# Patient Record
Sex: MALE | Race: WHITE | NOT HISPANIC OR LATINO | Employment: OTHER | URBAN - METROPOLITAN AREA
[De-identification: names, ages, dates, MRNs, and addresses within clinical notes are randomized per-mention and may not be internally consistent; named-entity substitution may affect disease eponyms.]

---

## 2018-01-10 NOTE — PROGRESS NOTES
Assessment    1  Anxiety (300 00) (F41 9)    Discussion/Summary  Discussion Summary:   No change to lexparo , ok to decrease to 1/2 tablet when desired  call if any issues with lexapro or urination  return in 6months  no labs  Chief Complaint  Chief Complaint Free Text Note Form: pt is here for 3 month anxiety f/u  review colonoscopy      History of Present Illness  Anxiety Disorder (Follow-Up): The patient is being seen for follow-up of generalized anxiety disorder  The patient reports doing poorly  He has no comorbid illnesses  Started on lexapro 10 mg 3 weeks ago, feels better but having tiredness side effects   Interval symptoms:  denies anxiety, denies difficulty concentrating, new onset of restlessness, improved restlessness, denies panic attacks and denies depression  Associated symptoms: no grandiosity, no racing thoughts, no periods of euphoria, no hallucinations and no suicidal ideation  Medications:  the patient is not adherent to his medication regimen and he denies medication side effects  Disease management:  the patient is doing well with his goals  Review of Systems  Complete-Male:   Constitutional: no fever, no recent weight gain, no chills, not feeling tired and no recent weight loss  Eyes: no eyesight problems  ENT: no nosebleeds, no sore throat and no nasal discharge  Cardiovascular: no palpitations and no extremity edema  Respiratory: no shortness of breath  Gastrointestinal: no nausea and no vomiting  Genitourinary: nocturia and 0-1  Musculoskeletal: no arthralgias  Integumentary: no rashes  Neurological: no headache and no dizziness  Psychiatric: as noted in HPI  Hematologic/Lymphatic: no tendency for easy bruising  Active Problems    1  Adrenal adenoma (227 0) (D35 00)   2  Anxiety (300 00) (F41 9)   3  History of colon polyps (V12 72) (Z86 010)   4  Thoracic aortic aneurysm (441 2) (I71 2)    Past Medical History    1   History of arthritis (V13 4) (Z87 39)   2  History of cardiac disorder (V12 50) (Z86 79)   3  History of hearing loss (V12 49) (Z86 69)   4  History of Screen for colon cancer (V76 51) (Z12 11)    Surgical History    1  History of Appendectomy   2  History of Hernia Repair   3  History of Shoulder Surgery Left    Family History    1  Denied: Family history of Colon cancer   2  Denied: Family history of Crohn's disease without complication, unspecified gastrointestinal tract   location   3  Denied: Family history of liver disease    4  Denied: Family history of Colon cancer   5  Denied: Family history of Crohn's disease without complication, unspecified gastrointestinal tract   location   6  Denied: Family history of liver disease   7  Family history of Throat cancer    Social History    · Denied: History of Drug use   · Former smoker (V15 82) (D13 596)   · Occasional alcohol use    Current Meds   1  Daily Multi Oral Tablet; TAKE 1 TABLET DAILY; Therapy: (Recorded:[id number]) to Recorded   2  Escitalopram Oxalate 10 MG Oral Tablet; TAKE 1 TABLET DAILY; Therapy: [id number] to (Xander Mahoney)  Requested for: 05Aps8322; Last Rx:[id number]   Ordered  Medication List Reviewed: The medication list was reviewed and updated today  Allergies    1  No Known Drug Allergies    Vitals  Vital Signs [Data Includes: Current Encounter]    Recorded: 73DRQ2721 03:13PM   Temperature 98 2 F, Oral   Heart Rate 77   Systolic 061, LUE, Sitting   Diastolic 82, LUE, Sitting   BP Cuff Size Large   Height 6 ft    Weight 203 lb    BMI Calculated 27 53   BSA Calculated 2 14   O2 Saturation 97, RA     Physical Exam    Constitutional   General appearance: No acute distress, well appearing and well nourished  Eyes   Conjunctiva and lids: No swelling, erythema, or discharge  Ears, Nose, Mouth, and Throat   External inspection of ears and nose: Normal     Oropharynx: Normal with no erythema, edema, exudate or lesions      Pulmonary   Auscultation of lungs: Clear to auscultation, equal breath sounds bilaterally, no wheezes, no rales, no rhonci  Cardiovascular   Auscultation of heart: Normal rate and rhythm, normal S1 and S2, without murmurs  Examination of extremities for edema and/or varicosities: Normal     Carotid pulses: Normal     Abdomen   Abdomen: Non-tender, no masses  Liver and spleen: No hepatomegaly or splenomegaly  Musculoskeletal   Gait and station: Normal     Neurologic   Cranial nerves: Cranial nerves 2-12 intact  Psychiatric   Orientation to person, place and time: Normal     Mood and affect: Normal     Additional Exam:  heme occult negative, mild BPH, enlarged hemorrhoid at 7 oclock  Health Management  History of colon polyps   COLONOSCOPY; every 3 years; Last [id number]; Next Due: [id number];  Active    Signatures   Electronically signed by : García Beach DO; Jan 14 2016  3:30PM EST                       (Author)

## 2018-02-12 ENCOUNTER — OFFICE VISIT (OUTPATIENT)
Dept: INTERNAL MEDICINE CLINIC | Age: 63
End: 2018-02-12
Payer: COMMERCIAL

## 2018-02-12 ENCOUNTER — TELEPHONE (OUTPATIENT)
Dept: INTERNAL MEDICINE CLINIC | Age: 63
End: 2018-02-12

## 2018-02-12 VITALS
HEART RATE: 78 BPM | HEIGHT: 72 IN | BODY MASS INDEX: 26.87 KG/M2 | OXYGEN SATURATION: 97 % | DIASTOLIC BLOOD PRESSURE: 86 MMHG | TEMPERATURE: 97.1 F | SYSTOLIC BLOOD PRESSURE: 120 MMHG | WEIGHT: 198.4 LBS

## 2018-02-12 DIAGNOSIS — I71.2 THORACIC AORTIC ANEURYSM WITHOUT RUPTURE (HCC): ICD-10-CM

## 2018-02-12 DIAGNOSIS — J20.9 ACUTE BRONCHITIS, UNSPECIFIED ORGANISM: ICD-10-CM

## 2018-02-12 DIAGNOSIS — R07.89 CHEST TIGHTNESS: Primary | ICD-10-CM

## 2018-02-12 PROCEDURE — 3008F BODY MASS INDEX DOCD: CPT | Performed by: NURSE PRACTITIONER

## 2018-02-12 PROCEDURE — 99213 OFFICE O/P EST LOW 20 MIN: CPT | Performed by: NURSE PRACTITIONER

## 2018-02-12 PROCEDURE — 93000 ELECTROCARDIOGRAM COMPLETE: CPT | Performed by: NURSE PRACTITIONER

## 2018-02-12 RX ORDER — LEVOFLOXACIN 500 MG/1
500 TABLET, FILM COATED ORAL EVERY 24 HOURS
Qty: 7 TABLET | Refills: 0 | Status: SHIPPED | OUTPATIENT
Start: 2018-02-12 | End: 2018-02-19

## 2018-02-12 RX ORDER — ESCITALOPRAM OXALATE 10 MG/1
1 TABLET ORAL DAILY
COMMUNITY
Start: 2015-08-03 | End: 2018-12-07

## 2018-02-12 RX ORDER — LEVOFLOXACIN 500 MG/1
500 TABLET, FILM COATED ORAL EVERY 24 HOURS
Qty: 7 TABLET | Refills: 0 | Status: SHIPPED | OUTPATIENT
Start: 2018-02-12 | End: 2018-02-12 | Stop reason: SDUPTHER

## 2018-02-12 NOTE — PROGRESS NOTES
Assessment/Plan:    Bronchitis:  Your s/sx have been ongoing for > 4 weeks  Will start course of levaquin that will cover pneumonia  You EKG in office was normal and reviewed with Dr Kenny Traylor  Rest   Stay well hydrated  Return for new/worsening s/sx  Go to the ED if you develop worsening CP that radiates into the arm, neck or jaw  Dizziness, lightheadedness  Shortness of breath  Diagnoses and all orders for this visit:    Chest tightness  -     POCT ECG    Thoracic aortic aneurysm without rupture (HCC)    Acute bronchitis, unspecified organism  -     Discontinue: levofloxacin (LEVAQUIN) 500 mg tablet; Take 1 tablet (500 mg total) by mouth every 24 hours for 7 days  -     levofloxacin (LEVAQUIN) 500 mg tablet; Take 1 tablet (500 mg total) by mouth every 24 hours for 7 days    Other orders  -     Multiple Vitamins-Minerals (MULTIVITAMIN ADULT PO); Take 1 tablet by mouth daily  -     escitalopram (LEXAPRO) 10 mg tablet; Take 1 tablet by mouth daily        Subjective:      Patient ID: Philip Verma is a 57 y o  male  HPI     Pt states that he was having cold, congestion for approx 4 weeks  He was starting to feel better then started with SOB and chest tightness on Saturday  Pt has a nonproductive dry cough  Denies wheezing  C/o chest tightness - improves with hot tea  Pt states exertion makes worse  Denies neck pain or jaw pain  Denies n/v   States feels "off balanced" and slightly lightheaded  No ill contacts    Of note pt with hx of thoracic aortic aneurysm  No change in 15-20 years per pt  The following portions of the patient's history were reviewed and updated as appropriate: allergies, current medications, past family history, past medical history, past social history, past surgical history and problem list     Review of Systems   Constitutional: Negative for chills, fatigue and fever  HENT: Negative for congestion, ear pain, postnasal drip, sinus pressure and sore throat  Respiratory: Positive for cough, chest tightness and shortness of breath  Negative for wheezing  Cardiovascular: Positive for chest pain  Negative for palpitations and leg swelling  Gastrointestinal: Positive for diarrhea  Negative for abdominal pain, nausea and vomiting  Musculoskeletal: Negative for arthralgias and myalgias  Skin: Negative for rash  Neurological: Positive for light-headedness  Negative for dizziness, syncope and headaches  Past Medical History:   Diagnosis Date    Adrenal adenoma     Anxiety     Polyp of colon     Testicular cyst     Thoracic aortic aneurysm (HCC)          Current Outpatient Prescriptions:     escitalopram (LEXAPRO) 10 mg tablet, Take 1 tablet by mouth daily, Disp: , Rfl:     Multiple Vitamins-Minerals (MULTIVITAMIN ADULT PO), Take 1 tablet by mouth daily, Disp: , Rfl:     No Known Allergies    Social History   Past Surgical History:   Procedure Laterality Date    APPENDECTOMY      HERNIA REPAIR      SHOULDER SURGERY Left      Family History   Problem Relation Age of Onset    Hypertension Mother     Hypertension Father        Objective:  /86 (BP Location: Left arm, Patient Position: Sitting, Cuff Size: Standard)   Pulse 78   Temp (!) 97 1 °F (36 2 °C) (Tympanic)   Ht 6' (1 829 m)   Wt 90 kg (198 lb 6 4 oz)   SpO2 97%   BMI 26 91 kg/m²      Physical Exam   Constitutional: He is oriented to person, place, and time  He appears well-developed and well-nourished  No distress  HENT:   Head: Normocephalic and atraumatic  Right Ear: Hearing, tympanic membrane, external ear and ear canal normal    Left Ear: Hearing, tympanic membrane, external ear and ear canal normal    Nose: Nose normal    Mouth/Throat: Uvula is midline, oropharynx is clear and moist and mucous membranes are normal    Cardiovascular: Normal rate and regular rhythm  No pedal edema   Pulmonary/Chest: Effort normal and breath sounds normal  No respiratory distress   He has no wheezes  Lymphadenopathy:     He has no cervical adenopathy  Neurological: He is alert and oriented to person, place, and time  Skin: Skin is warm and dry  Psychiatric: He has a normal mood and affect  His behavior is normal  Judgment and thought content normal        EKG:  NSR    No acute ischemia

## 2018-02-12 NOTE — PATIENT INSTRUCTIONS
Bronchitis:  Your s/sx have been ongoing for > 4 weeks  Will start course of levaquin that will cover pneumonia  You EKG in office was normal and reviewed with Dr Yesenia Truong  Rest   Stay well hydrated  Return for new/worsening s/sx  Go to the ED if you develop worsening CP that radiates into the arm, neck or jaw  Dizziness, lightheadedness  Shortness of breath

## 2018-12-05 ENCOUNTER — TELEPHONE (OUTPATIENT)
Dept: INTERNAL MEDICINE CLINIC | Age: 63
End: 2018-12-05

## 2018-12-05 NOTE — TELEPHONE ENCOUNTER
Girls can one of you look into if this patient got the vaccine already and if he's able to have one during his visit on Friday?     [phone]

## 2018-12-05 NOTE — TELEPHONE ENCOUNTER
He is due for immunization at age 70  Looked at immunization in epic and seen no prior pneumonia vaccine     Chart prep should look at [facility] if pt has had prior vaccine or d/w pt when he is roomed  Typically would be determined at visit

## 2018-12-05 NOTE — TELEPHONE ENCOUNTER
I called and informed that patient that this vaccine administation will be discussed at his OV on Friday

## 2018-12-07 ENCOUNTER — OFFICE VISIT (OUTPATIENT)
Dept: INTERNAL MEDICINE CLINIC | Age: 63
End: 2018-12-07
Payer: COMMERCIAL

## 2018-12-07 ENCOUNTER — TELEPHONE (OUTPATIENT)
Dept: INTERNAL MEDICINE CLINIC | Age: 63
End: 2018-12-07

## 2018-12-07 VITALS
HEIGHT: 72 IN | TEMPERATURE: 97.5 F | OXYGEN SATURATION: 99 % | BODY MASS INDEX: 26.79 KG/M2 | HEART RATE: 73 BPM | WEIGHT: 197.8 LBS | DIASTOLIC BLOOD PRESSURE: 90 MMHG | SYSTOLIC BLOOD PRESSURE: 120 MMHG

## 2018-12-07 DIAGNOSIS — F41.9 ANXIETY: Primary | ICD-10-CM

## 2018-12-07 DIAGNOSIS — D35.00 ADRENAL ADENOMA, UNSPECIFIED LATERALITY: ICD-10-CM

## 2018-12-07 DIAGNOSIS — Z12.5 SCREENING FOR PROSTATE CANCER: ICD-10-CM

## 2018-12-07 DIAGNOSIS — I71.2 THORACIC AORTIC ANEURYSM WITHOUT RUPTURE (HCC): ICD-10-CM

## 2018-12-07 DIAGNOSIS — H25.9 AGE-RELATED CATARACT OF BOTH EYES, UNSPECIFIED AGE-RELATED CATARACT TYPE: ICD-10-CM

## 2018-12-07 DIAGNOSIS — F41.9 ANXIETY: ICD-10-CM

## 2018-12-07 DIAGNOSIS — Z01.818 PRE-OP EVALUATION: Primary | ICD-10-CM

## 2018-12-07 DIAGNOSIS — Z13.220 SCREENING FOR HYPERLIPIDEMIA: ICD-10-CM

## 2018-12-07 DIAGNOSIS — Z11.59 NEED FOR HEPATITIS C SCREENING TEST: ICD-10-CM

## 2018-12-07 PROBLEM — R07.89 CHEST TIGHTNESS: Status: RESOLVED | Noted: 2018-02-12 | Resolved: 2018-12-07

## 2018-12-07 PROCEDURE — 99214 OFFICE O/P EST MOD 30 MIN: CPT | Performed by: NURSE PRACTITIONER

## 2018-12-07 NOTE — PROGRESS NOTES
Assessment/Plan:    70 y o  male with planned surgery as noted below  Known risk factors for perioperative complications  Significant past medical history includes thoracic aortic aneurysm, adrenal adenoma, anxiety  Pt with no PMH of arrhythmia, CAD, MI, CHF, hepatitis, PE/DVT, DM, thyroid disease, seizure, CVA, asthma, COPD, dentures  Omar Conway is cleared from a cardiovascular standpoint to proceed with a low risk surgery  he is at a low risk from a cardiovascular standpoint at this time without any additional cardiac testing  Reevaluation needed if he should present with symptoms prior to surgery  Preoperative workup as follows:  None    Of note pt has not been seen for routine follow-up in almost 2 years  He was given orders to update labs and will return in 1 month for routine follow-up  Given low risk procedure of cataract and local anesthesia, no labs, ekg or imaging needed prior  Diagnoses and all orders for this visit:    Pre-op evaluation    Age-related cataract of both eyes, unspecified age-related cataract type    Thoracic aortic aneurysm without rupture ([address] )    Adrenal adenoma, unspecified laterality    Anxiety        Subjective: Omar Conway is a 70y o  year old male who presents to the office today for a preoperative consultation at the request of surgeon Dr Juju Joseph who plans on performing cataract on 12/11 and 12/27  Planned anesthesia: local  The patient has the following known anesthesia issues: none  Patients bleeding risk: no recent abnormal bleeding, no remote history of abnormal bleeding and no use of Ca-channel blockers  Living situation: Patient lives with wife in a two store home  She will be caring for him after surgery  He does not have post-op concerns  Patient is able to walk 4 blocks without symptoms  Patient is able to walk up 2 flights of stairs without symptoms       Significant past medical history includes thoracic aortic aneurysm, adrenal adenoma, anxiety  Pt with no PMH of arrhythmia, CAD, MI, CHF, hepatitis, PE/DVT, DM, thyroid disease, seizure, CVA, asthma, COPD, dentures  Tobacco use: no  Alcohol use: socially - few times a month  Illicit drug use: no    Symptoms:   Easy bleeding: no  Easy bruising: no  Frequent nose bleeds: no  Chest pain: no  Cough: no  Dyspnea on exertion:no  Edema: no  Palpitations: no  Wheezing: no    The following portions of the patient's history were reviewed and updated as appropriate: allergies, current medications, past family history, past medical history, past social history, past surgical history and problem list     Review of Systems   Constitutional: Negative for appetite change, chills, fatigue, fever and unexpected weight change  HENT: Negative for congestion, ear pain, sinus pain, sinus pressure and sore throat  Eyes: Positive for visual disturbance  Respiratory: Negative for cough, shortness of breath and wheezing  Cardiovascular: Negative for chest pain, palpitations and leg swelling  Gastrointestinal: Negative for abdominal pain, constipation, diarrhea, nausea and vomiting  Genitourinary: Negative for dysuria, frequency and hematuria  Musculoskeletal: Negative for arthralgias, back pain and myalgias  Skin: Negative for rash  Neurological: Negative for dizziness, light-headedness and headaches  Psychiatric/Behavioral: Negative for dysphoric mood and sleep disturbance  The patient is not nervous/anxious            Past Medical History:   Diagnosis Date    Adrenal adenoma     Anxiety     Arthritis     Cardiac disorder     Hearing loss     Polyp of colon     Testicular cyst     Thoracic aortic aneurysm (HCC)          Current Outpatient Prescriptions:     Multiple Vitamins-Minerals (MULTIVITAMIN ADULT PO), Take 1 tablet by mouth daily, Disp: , Rfl:     escitalopram (LEXAPRO) 10 mg tablet, Take 1 tablet by mouth daily, Disp: , Rfl:     No Known Allergies    Social History   Past Surgical History:   Procedure Laterality Date    APPENDECTOMY      HERNIA REPAIR      2    SHOULDER SURGERY Left      Family History   Problem Relation Age of Onset    Hypertension Mother     Hypertension Father     Throat cancer Father        Objective:  /90 (BP Location: Right arm, Patient Position: Sitting, Cuff Size: Large)   Pulse 73   Temp 97 5 °F (36 4 °C) (Tympanic)   Ht 5' 11 95" (1 828 m)   Wt 89 7 kg (197 lb 12 8 oz)   SpO2 99%   BMI 26 86 kg/m²      Physical Exam   Constitutional: He is oriented to person, place, and time  He appears well-developed and well-nourished  No distress  HENT:   Head: Normocephalic and atraumatic  Right Ear: External ear normal    Left Ear: External ear normal    Nose: Nose normal    Mouth/Throat: Oropharynx is clear and moist  No oropharyngeal exudate  Eyes:   Equal and round   Neck: Neck supple  Cardiovascular: Normal rate, regular rhythm and normal heart sounds  No murmur heard  No pedal edema   Pulmonary/Chest: Effort normal and breath sounds normal  No respiratory distress  He has no wheezes  Abdominal: Soft  Bowel sounds are normal  He exhibits no distension and no mass  There is no tenderness  There is no rebound and no guarding  Musculoskeletal: Normal range of motion  Lymphadenopathy:     He has no cervical adenopathy  Neurological: He is alert and oriented to person, place, and time  No cranial nerve deficit  Skin: Skin is warm and dry  No rash noted  Psychiatric: He has a normal mood and affect  His behavior is normal  Judgment and thought content normal    Nursing note and vitals reviewed

## 2019-01-17 ENCOUNTER — APPOINTMENT (OUTPATIENT)
Dept: LAB | Age: 64
End: 2019-01-17
Payer: COMMERCIAL

## 2019-01-17 DIAGNOSIS — Z12.5 SCREENING FOR PROSTATE CANCER: ICD-10-CM

## 2019-01-17 DIAGNOSIS — Z11.59 NEED FOR HEPATITIS C SCREENING TEST: ICD-10-CM

## 2019-01-17 DIAGNOSIS — F41.9 ANXIETY: ICD-10-CM

## 2019-01-17 DIAGNOSIS — I71.2 THORACIC AORTIC ANEURYSM WITHOUT RUPTURE (HCC): ICD-10-CM

## 2019-01-17 DIAGNOSIS — Z13.220 SCREENING FOR HYPERLIPIDEMIA: ICD-10-CM

## 2019-01-17 LAB
ALBUMIN SERPL BCP-MCNC: 3.9 G/DL (ref 3.5–5)
ALP SERPL-CCNC: 59 U/L (ref 46–116)
ALT SERPL W P-5'-P-CCNC: 34 U/L (ref 12–78)
ANION GAP SERPL CALCULATED.3IONS-SCNC: 7 MMOL/L (ref 4–13)
AST SERPL W P-5'-P-CCNC: 19 U/L (ref 5–45)
BASOPHILS # BLD AUTO: 0.05 THOUSANDS/ΜL (ref 0–0.1)
BASOPHILS NFR BLD AUTO: 1 % (ref 0–1)
BILIRUB SERPL-MCNC: 0.38 MG/DL (ref 0.2–1)
BUN SERPL-MCNC: 19 MG/DL (ref 5–25)
CALCIUM SERPL-MCNC: 8.7 MG/DL (ref 8.3–10.1)
CHLORIDE SERPL-SCNC: 104 MMOL/L (ref 100–108)
CHOLEST SERPL-MCNC: 176 MG/DL (ref 50–200)
CO2 SERPL-SCNC: 26 MMOL/L (ref 21–32)
CREAT SERPL-MCNC: 0.85 MG/DL (ref 0.6–1.3)
EOSINOPHIL # BLD AUTO: 0.08 THOUSAND/ΜL (ref 0–0.61)
EOSINOPHIL NFR BLD AUTO: 2 % (ref 0–6)
ERYTHROCYTE [DISTWIDTH] IN BLOOD BY AUTOMATED COUNT: 12.1 % (ref 11.6–15.1)
GFR SERPL CREATININE-BSD FRML MDRD: 91 ML/MIN/1.73SQ M
GLUCOSE P FAST SERPL-MCNC: 99 MG/DL (ref 65–99)
HCT VFR BLD AUTO: 44.2 % (ref 36.5–49.3)
HCV AB SER QL: NORMAL
HDLC SERPL-MCNC: 51 MG/DL (ref 40–60)
HGB BLD-MCNC: 14.7 G/DL (ref 12–17)
IMM GRANULOCYTES # BLD AUTO: 0.01 THOUSAND/UL (ref 0–0.2)
IMM GRANULOCYTES NFR BLD AUTO: 0 % (ref 0–2)
LDLC SERPL CALC-MCNC: 110 MG/DL (ref 0–100)
LYMPHOCYTES # BLD AUTO: 2.57 THOUSANDS/ΜL (ref 0.6–4.47)
LYMPHOCYTES NFR BLD AUTO: 46 % (ref 14–44)
MCH RBC QN AUTO: 31.1 PG (ref 26.8–34.3)
MCHC RBC AUTO-ENTMCNC: 33.3 G/DL (ref 31.4–37.4)
MCV RBC AUTO: 94 FL (ref 82–98)
MONOCYTES # BLD AUTO: 0.44 THOUSAND/ΜL (ref 0.17–1.22)
MONOCYTES NFR BLD AUTO: 8 % (ref 4–12)
NEUTROPHILS # BLD AUTO: 2.33 THOUSANDS/ΜL (ref 1.85–7.62)
NEUTS SEG NFR BLD AUTO: 43 % (ref 43–75)
NONHDLC SERPL-MCNC: 125 MG/DL
NRBC BLD AUTO-RTO: 0 /100 WBCS
PLATELET # BLD AUTO: 240 THOUSANDS/UL (ref 149–390)
PMV BLD AUTO: 8.6 FL (ref 8.9–12.7)
POTASSIUM SERPL-SCNC: 4 MMOL/L (ref 3.5–5.3)
PROT SERPL-MCNC: 7.4 G/DL (ref 6.4–8.2)
PSA SERPL-MCNC: 0.9 NG/ML (ref 0–4)
RBC # BLD AUTO: 4.72 MILLION/UL (ref 3.88–5.62)
SODIUM SERPL-SCNC: 137 MMOL/L (ref 136–145)
TRIGL SERPL-MCNC: 76 MG/DL
TSH SERPL DL<=0.05 MIU/L-ACNC: 1.31 UIU/ML (ref 0.36–3.74)
WBC # BLD AUTO: 5.48 THOUSAND/UL (ref 4.31–10.16)

## 2019-01-17 PROCEDURE — 80053 COMPREHEN METABOLIC PANEL: CPT

## 2019-01-17 PROCEDURE — 36415 COLL VENOUS BLD VENIPUNCTURE: CPT

## 2019-01-17 PROCEDURE — 80061 LIPID PANEL: CPT

## 2019-01-17 PROCEDURE — 84443 ASSAY THYROID STIM HORMONE: CPT

## 2019-01-17 PROCEDURE — 86803 HEPATITIS C AB TEST: CPT

## 2019-01-17 PROCEDURE — 85025 COMPLETE CBC W/AUTO DIFF WBC: CPT

## 2019-01-17 PROCEDURE — G0103 PSA SCREENING: HCPCS

## 2019-01-28 ENCOUNTER — OFFICE VISIT (OUTPATIENT)
Dept: INTERNAL MEDICINE CLINIC | Age: 64
End: 2019-01-28
Payer: COMMERCIAL

## 2019-01-28 VITALS
OXYGEN SATURATION: 97 % | TEMPERATURE: 97.4 F | SYSTOLIC BLOOD PRESSURE: 130 MMHG | BODY MASS INDEX: 27.41 KG/M2 | WEIGHT: 202.4 LBS | HEART RATE: 84 BPM | HEIGHT: 72 IN | DIASTOLIC BLOOD PRESSURE: 84 MMHG

## 2019-01-28 DIAGNOSIS — Z12.11 SCREENING FOR COLON CANCER: Primary | ICD-10-CM

## 2019-01-28 DIAGNOSIS — Z00.00 WELCOME TO MEDICARE PREVENTIVE VISIT: ICD-10-CM

## 2019-01-28 DIAGNOSIS — Z23 NEED FOR VACCINATION WITH 13-POLYVALENT PNEUMOCOCCAL CONJUGATE VACCINE: ICD-10-CM

## 2019-01-28 PROBLEM — H18.519 FUCHS' CORNEAL DYSTROPHY: Status: ACTIVE | Noted: 2019-01-28

## 2019-01-28 PROCEDURE — 3008F BODY MASS INDEX DOCD: CPT | Performed by: FAMILY MEDICINE

## 2019-01-28 PROCEDURE — G0402 INITIAL PREVENTIVE EXAM: HCPCS | Performed by: FAMILY MEDICINE

## 2019-01-28 PROCEDURE — G0403 EKG FOR INITIAL PREVENT EXAM: HCPCS | Performed by: FAMILY MEDICINE

## 2019-01-28 PROCEDURE — 90670 PCV13 VACCINE IM: CPT | Performed by: FAMILY MEDICINE

## 2019-01-28 PROCEDURE — 1125F AMNT PAIN NOTED PAIN PRSNT: CPT | Performed by: FAMILY MEDICINE

## 2019-01-28 PROCEDURE — G0009 ADMIN PNEUMOCOCCAL VACCINE: HCPCS | Performed by: FAMILY MEDICINE

## 2019-01-28 PROCEDURE — 1170F FXNL STATUS ASSESSED: CPT | Performed by: FAMILY MEDICINE

## 2019-01-28 PROCEDURE — 4040F PNEUMOC VAC/ADMIN/RCVD: CPT | Performed by: FAMILY MEDICINE

## 2019-01-28 RX ORDER — BIOTIN 1 MG
1 TABLET ORAL DAILY
COMMUNITY
End: 2021-05-24

## 2019-01-28 NOTE — PROGRESS NOTES
Assessment and Plan:    Problem List Items Addressed This Visit     None      Visit Diagnoses     Screening for colon cancer    -  Primary    Welcome to Medicare preventive visit        Relevant Orders    POCT ECG (Completed)    Need for vaccination with 13-polyvalent pneumococcal conjugate vaccine        Relevant Orders    PNEUMOCOCCAL CONJUGATE VACCINE 13-VALENT GREATER THAN 6 MONTHS (Completed)        Health Maintenance Due   Topic Date Due    Medicare Annual Wellness Visit (AWV)  1955    DTaP,Tdap,and Td Vaccines (1 - Tdap) 07/13/1974    CRC Screening: Colonoscopy  12/02/2018     Influenza, up-to-date   PSA January 2019  Eye exam is up-to-date  Dental exam is up-to-date  Colonoscopy 2015, not due till 2 years, Dr Barrett Killer  Pneumonia vaccines, none, Prevnar given today  CT scan thoracic aorta done 2016, previous 2011  Stable and no change   EKG done today, normal sinus rhythm    No issues    HPI:  Yajaira Kruse is a 70 y o  male here for his IPPE(Welcome to Medicare Visit )    Patient Active Problem List   Diagnosis    Adrenal adenoma    Anxiety    Thoracic aortic aneurysm [facility])    Testicular cyst    Need for hepatitis C screening test    Screening for hyperlipidemia    Screening for prostate cancer    Pre-op evaluation    Age-related cataract of both eyes    Fuchs' corneal dystrophy     Past Medical History:   Diagnosis Date    Adrenal adenoma     Anxiety     Arthritis     Cardiac disorder     Hearing loss     Polyp of colon     Testicular cyst     Thoracic aortic aneurysm ([address] )      Past Surgical History:   Procedure Laterality Date    APPENDECTOMY      CATARACT EXTRACTION, BILATERAL      HERNIA REPAIR      2    SHOULDER SURGERY Left      Family History   Problem Relation Age of Onset    Hypertension Mother     Hypertension Father     Throat cancer Father      History   Smoking Status    Former Smoker    Types: Cigarettes    Quit date: 1973   Smokeless Tobacco    Never Used History   Alcohol Use    Yes     Comment: occasional      History   Drug Use No       Current Outpatient Prescriptions   Medication Sig Dispense Refill    Cholecalciferol (VITAMIN D3) 1000 units CAPS Take 1 capsule by mouth daily      Glucosamine HCl ([facility] GLUCOSAMINE PO) Take 1 tablet by mouth daily      Multiple Vitamins-Minerals (MULTIVITAMIN ADULT PO) Take 1 tablet by mouth daily       No current facility-administered medications for this visit  No Known Allergies  Immunization History   Administered Date(s) Administered    Influenza 11/05/2018    Pneumococcal Conjugate 13-Valent 01/28/2019    Zoster 01/14/2016       Patient Care Team:  Elliot Urena DO as PCP - General  MD Rory Solano MD    Medicare Screening Tests and Risk Assessments:      Health Risk Assessment:  Patient rates overall health as very good  Patient feels that their physical health rating is Same  Eyesight was rated as Same  Hearing was rated as Slightly worse  Patient feels that their emotional and mental health rating is Slightly better  Pain experienced by patient in the last 7 days has been Some  Patient's pain rating has been 3/10  Patient states that he has experienced no weight loss or gain in last 6 months  Emotional/Mental Health:  Patient has been feeling nervous/anxious  PHQ-9 Depression Screening:    Frequency of the following problems over the past two weeks:      1  Little interest or pleasure in doing things: 0 - not at all      2  Feeling down, depressed, or hopeless: 0 - not at all  PHQ-2 Score: 0          Broken Bones/Falls: Fall Risk Assessment:    In the past year, patient has experienced: No history of falling in past year          Bladder/Bowel:  Patient has not leaked urine accidently in the last six months  Patient reports no loss of bowel control  Immunizations:  Patient has had a flu vaccination within the last year  Patient has not received a pneumonia shot    Patient has received a shingles shot  Patient has not received tetanus/diphtheria shot  Home Safety:  Patient does not have trouble with stairs inside or outside of their home  Patient currently reports that there are no safety hazards present in home, working smoke alarms, working carbon monoxide detectors  Preventative Screenings:   prostate cancer screen performed, colon cancer screen completed, cholesterol screen completed, glaucoma eye exam completed,     Nutrition:  Current diet: Regular and Limited junk food with servings of the following:    Medications:  Patient is currently taking over-the-counter supplements  List of OTC medications includes: multi vitamin, glucosamine and Vitamin D3  Patient is able to manage medications  Lifestyle Choices:  Patient reports no tobacco use  Patient has smoked or used tobacco in the past   Patient has stopped his tobacco use  Tobacco use quit date: 1974  Patient reports alcohol use  Alcohol use per week: 2-3  Patient drives a vehicle  Patient wears seat belt  Current level of exercise of physical activity described by patient as: active  Activities of Daily Living:  Can get out of bed by his or her self, able to dress self, able to make own meals, able to do own shopping, able to bathe self, can do own laundry/housekeeping, can manage own money, pay bills and track expenses    Previous Hospitalizations:  No hospitalization or ED visit in past 12 months        Advanced Directives:  Patient has decided on a power of   Patient has spoken to designated power of   Patient has not completed advanced directive

## 2019-02-21 ENCOUNTER — PROCEDURE VISIT (OUTPATIENT)
Dept: INTERNAL MEDICINE CLINIC | Facility: CLINIC | Age: 64
End: 2019-02-21
Payer: COMMERCIAL

## 2019-02-21 VITALS
TEMPERATURE: 98.2 F | HEIGHT: 72 IN | HEART RATE: 78 BPM | SYSTOLIC BLOOD PRESSURE: 116 MMHG | DIASTOLIC BLOOD PRESSURE: 70 MMHG | OXYGEN SATURATION: 98 % | WEIGHT: 202 LBS | BODY MASS INDEX: 27.36 KG/M2

## 2019-02-21 DIAGNOSIS — D23.62 DYSPLASTIC NEVUS OF LEFT UPPER EXTREMITY: ICD-10-CM

## 2019-02-21 DIAGNOSIS — Z12.11 COLON CANCER SCREENING: Primary | ICD-10-CM

## 2019-02-21 PROCEDURE — 11301 SHAVE SKIN LESION 0.6-1.0 CM: CPT | Performed by: FAMILY MEDICINE

## 2019-02-21 PROCEDURE — 88305 TISSUE EXAM BY PATHOLOGIST: CPT | Performed by: PATHOLOGY

## 2019-02-21 NOTE — PROGRESS NOTES
Shave lesion  Date/Time: 2/21/2019 2:41 PM  Performed by: Georgette Price DO  Authorized by: Georgette Price DO     Number of Lesions: 1  Lesion 1:     Body area: upper extremity    Upper extremity location: L upper arm    Initial size (mm): 5    Final defect size (mm): 8    Malignancy: malignancy unknown      Destruction method: shave removal      Comments:  Post care insructions given to pt today, sent for biopsy

## 2019-04-26 DIAGNOSIS — K63.5 POLYP OF COLON, UNSPECIFIED PART OF COLON, UNSPECIFIED TYPE: Primary | ICD-10-CM

## 2019-08-19 ENCOUNTER — OFFICE VISIT (OUTPATIENT)
Dept: INTERNAL MEDICINE CLINIC | Facility: CLINIC | Age: 64
End: 2019-08-19
Payer: COMMERCIAL

## 2019-08-19 ENCOUNTER — HOSPITAL ENCOUNTER (OUTPATIENT)
Dept: RADIOLOGY | Facility: IMAGING CENTER | Age: 64
Discharge: HOME | End: 2019-08-19
Payer: COMMERCIAL

## 2019-08-19 VITALS
WEIGHT: 198 LBS | HEART RATE: 80 BPM | OXYGEN SATURATION: 97 % | HEIGHT: 73 IN | TEMPERATURE: 98.2 F | BODY MASS INDEX: 26.24 KG/M2 | DIASTOLIC BLOOD PRESSURE: 78 MMHG | SYSTOLIC BLOOD PRESSURE: 122 MMHG

## 2019-08-19 DIAGNOSIS — R06.00 DOE (DYSPNEA ON EXERTION): ICD-10-CM

## 2019-08-19 DIAGNOSIS — F41.9 ANXIETY: ICD-10-CM

## 2019-08-19 DIAGNOSIS — R07.89 CHEST TIGHTNESS: Primary | ICD-10-CM

## 2019-08-19 DIAGNOSIS — R07.89 CHEST TIGHTNESS: ICD-10-CM

## 2019-08-19 DIAGNOSIS — I71.2 THORACIC AORTIC ANEURYSM WITHOUT RUPTURE (HCC): ICD-10-CM

## 2019-08-19 PROBLEM — Z01.818 PRE-OP EVALUATION: Status: RESOLVED | Noted: 2018-12-07 | Resolved: 2019-08-19

## 2019-08-19 PROBLEM — R06.09 DOE (DYSPNEA ON EXERTION): Status: ACTIVE | Noted: 2019-08-19

## 2019-08-19 PROCEDURE — 99214 OFFICE O/P EST MOD 30 MIN: CPT | Performed by: FAMILY MEDICINE

## 2019-08-19 PROCEDURE — 71046 X-RAY EXAM CHEST 2 VIEWS: CPT

## 2019-08-19 PROCEDURE — 1036F TOBACCO NON-USER: CPT | Performed by: FAMILY MEDICINE

## 2019-08-19 PROCEDURE — 3008F BODY MASS INDEX DOCD: CPT | Performed by: FAMILY MEDICINE

## 2019-08-19 PROCEDURE — 1160F RVW MEDS BY RX/DR IN RCRD: CPT | Performed by: FAMILY MEDICINE

## 2019-08-19 PROCEDURE — 93000 ELECTROCARDIOGRAM COMPLETE: CPT | Performed by: FAMILY MEDICINE

## 2019-08-19 RX ORDER — ESCITALOPRAM OXALATE 10 MG/1
10 TABLET ORAL DAILY
Qty: 30 TABLET | Refills: 5 | Status: SHIPPED | OUTPATIENT
Start: 2019-08-19 | End: 2021-05-24 | Stop reason: SDUPTHER

## 2019-08-19 NOTE — PROGRESS NOTES
Assessment/Plan:    No problem-specific Assessment & Plan notes found for this encounter  Problem List Items Addressed This Visit        Cardiovascular and Mediastinum    Thoracic aortic aneurysm without rupture (HCC)       Other    Anxiety    Relevant Medications    escitalopram (LEXAPRO) 10 mg tablet    MATSON (dyspnea on exertion)    Relevant Orders    Echo stress test w contrast if indicated    XR chest pa & lateral    Lipid panel    Comprehensive metabolic panel    TSH, 3rd generation    Troponin I    CBC and differential      Other Visit Diagnoses     Chest tightness    -  Primary    Relevant Orders    POCT ECG (Completed)    Echo stress test w contrast if indicated    XR chest pa & lateral    Troponin I              EKG done today and elevation of J-point noted that is not changed from previous however patient is symptomatic now and has significant coronary artery disease family history  His brother who is older than him has had one-vessel CABG and he stated his mom and dad had issues as well  Check stress test, chest x-ray and lab work for now  Will need to repeat CT scan of the chest due to thoracic aneurysm which was last checked in 2016 and has been normal on previous examinations  Patient is to return to me to review results  He asked for prescription for Lexapro for his anxiety which she is not sure which has returned  This was printed out for him and he will consider restarting  He has been off of it for 1-2 years now  Not experiencing any current chest pain  He is advised to go to the emergency department if his shortness of breath gets worse or he has chest discomfort      EKG: normal EKG, normal sinus rhythm, unchanged from previous tracings  Subjective:      Patient ID: Jacques Harrell is a 75 y o  male  HPI  Dizziness (pt states that when he bends over and sits back up he is dizzy and lightheaded from sitting or standing position   sxs x3-4 weeks)     Shortness of Breath (pt also c/o MATSON  pt states this is new  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 and just from walking for mail for 150' he feels exhausted and huffing and puffing, tightness in chest, pt also states he has been diaphoretic more than usual upon minimal exertion  sxs x3-4 weeks)     Numbness (pt also c/o numbness and coldness in both feet  sxs x3 months)       Has occasional chest heaviness associated with shortness of breath but no actual chest pain  This is been going on for several weeks to approximately 1 month  It is gradual increase and no inciting events  Quit smoking in 1973 however prior to that was a very mild smoker a few cigarettes a day  Has family history of coronary artery disease in his parents and older brother  Older brother had one-vessel CABG  Patient's last lab work was in January and lipid panel was normal   He has not had any chest trauma changes in his prescriptions or supplementations and does not think he is experiencing extra anxiety  Of note he was on anxiety medication prior to this  No recent trauma  Feels that his sensation is different in his feet when he touches feet they are actually warm  He is active and able to do his activities of daily living without any problems  No sick contacts    BMI Counseling: Body mass index is 26 12 kg/m²  Discussed the patient's BMI with him  The BMI is above average  BMI counseling and education was provided to the patient  Nutrition recommendations include reducing portion sizes        The following portions of the patient's history were reviewed and updated as appropriate: allergies, current medications, past family history, past medical history, past social history, past surgical history and problem list     Review of Systems      Constitutional:  Denies fever or chills   Eyes:  Denies change in visual acuity   HENT:  Denies nasal congestion or sore throat   Respiratory:  Denies cough or shortness of breath or wheezing  Cardiovascular:  Denies palpitations or chest pain  GI:  Denies abdominal pain, nausea, or vomiting  Integument:  Denies rash   Neurologic:  Denies headache or focal weakness        Objective:      /78 (BP Location: Left arm, Patient Position: Sitting, Cuff Size: Adult)   Pulse 80   Temp 98 2 °F (36 8 °C) (Oral)   Ht 6' 1" (1 854 m)   Wt 89 8 kg (198 lb)   SpO2 97%   BMI 26 12 kg/m²          Physical Exam      Constitutional:  Well developed, well nourished, no acute distress, non-toxic appearance   Eyes:  PERRL, conjunctiva normal , non icteric sclera  HENT:  Atraumatic, oropharynx moist  Neck-  supple   Respiratory:  CTA b/l, normal breath sounds, no rales, no wheezing   Cardiovascular:  RRR, no murmurs, no LE edema b/l  GI:  Soft, nondistended, normal bowel sounds x 4, nontender, no organomegaly, no mass, no rebound, no guarding   Neurologic:  no focal deficits noted , pulses good in feet bilaterally  Psychiatric:  Speech and behavior appropriate , AAO x 3    The 10-year ASCVD risk score (Nasim De León et al , 2013) is: 11 5%    Values used to calculate the score:      Age: 75 years      Sex: Male      Is Non- : No      Diabetic: No      Tobacco smoker: No      Systolic Blood Pressure: 723 mmHg      Is BP treated: No      HDL Cholesterol: 51 mg/dL      Total Cholesterol: 176 mg/dL

## 2019-08-20 DIAGNOSIS — J43.2 CENTRILOBULAR EMPHYSEMA (HCC): Primary | ICD-10-CM

## 2019-08-22 DIAGNOSIS — J43.2 CENTRILOBULAR EMPHYSEMA (HCC): ICD-10-CM

## 2019-08-22 DIAGNOSIS — R06.00 DOE (DYSPNEA ON EXERTION): Primary | ICD-10-CM

## 2019-08-23 ENCOUNTER — APPOINTMENT (OUTPATIENT)
Dept: LAB | Age: 64
End: 2019-08-23
Payer: COMMERCIAL

## 2019-08-23 ENCOUNTER — TRANSCRIBE ORDERS (OUTPATIENT)
Dept: LAB | Facility: HOSPITAL | Age: 64
End: 2019-08-23

## 2019-08-23 DIAGNOSIS — J43.2 CENTRILOBULAR EMPHYSEMA (HCC): ICD-10-CM

## 2019-08-23 DIAGNOSIS — R07.89 CHEST TIGHTNESS: ICD-10-CM

## 2019-08-23 DIAGNOSIS — R07.89 CHEST TIGHTNESS: Primary | ICD-10-CM

## 2019-08-23 DIAGNOSIS — R06.00 DOE (DYSPNEA ON EXERTION): ICD-10-CM

## 2019-08-23 LAB
ALBUMIN SERPL BCP-MCNC: 4.5 G/DL (ref 3.5–5)
ALP SERPL-CCNC: 58 U/L (ref 46–116)
ALT SERPL W P-5'-P-CCNC: 32 U/L (ref 12–78)
ANION GAP SERPL CALCULATED.3IONS-SCNC: 7 MMOL/L (ref 4–13)
AST SERPL W P-5'-P-CCNC: 15 U/L (ref 5–45)
BASOPHILS # BLD AUTO: 0.05 THOUSANDS/ΜL (ref 0–0.1)
BASOPHILS NFR BLD AUTO: 1 % (ref 0–1)
BILIRUB SERPL-MCNC: 0.66 MG/DL (ref 0.2–1)
BUN SERPL-MCNC: 17 MG/DL (ref 5–25)
CALCIUM SERPL-MCNC: 9 MG/DL (ref 8.3–10.1)
CHLORIDE SERPL-SCNC: 106 MMOL/L (ref 100–108)
CHOLEST SERPL-MCNC: 192 MG/DL (ref 50–200)
CO2 SERPL-SCNC: 29 MMOL/L (ref 21–32)
CREAT SERPL-MCNC: 0.92 MG/DL (ref 0.6–1.3)
EOSINOPHIL # BLD AUTO: 0.06 THOUSAND/ΜL (ref 0–0.61)
EOSINOPHIL NFR BLD AUTO: 1 % (ref 0–6)
ERYTHROCYTE [DISTWIDTH] IN BLOOD BY AUTOMATED COUNT: 12.3 % (ref 11.6–15.1)
GFR SERPL CREATININE-BSD FRML MDRD: 86 ML/MIN/1.73SQ M
GLUCOSE P FAST SERPL-MCNC: 103 MG/DL (ref 65–99)
HCT VFR BLD AUTO: 45.7 % (ref 36.5–49.3)
HDLC SERPL-MCNC: 54 MG/DL (ref 40–60)
HGB BLD-MCNC: 15 G/DL (ref 12–17)
IMM GRANULOCYTES # BLD AUTO: 0 THOUSAND/UL (ref 0–0.2)
IMM GRANULOCYTES NFR BLD AUTO: 0 % (ref 0–2)
LDLC SERPL CALC-MCNC: 116 MG/DL (ref 0–100)
LYMPHOCYTES # BLD AUTO: 2.04 THOUSANDS/ΜL (ref 0.6–4.47)
LYMPHOCYTES NFR BLD AUTO: 43 % (ref 14–44)
MCH RBC QN AUTO: 30.6 PG (ref 26.8–34.3)
MCHC RBC AUTO-ENTMCNC: 32.8 G/DL (ref 31.4–37.4)
MCV RBC AUTO: 93 FL (ref 82–98)
MONOCYTES # BLD AUTO: 0.44 THOUSAND/ΜL (ref 0.17–1.22)
MONOCYTES NFR BLD AUTO: 9 % (ref 4–12)
NEUTROPHILS # BLD AUTO: 2.2 THOUSANDS/ΜL (ref 1.85–7.62)
NEUTS SEG NFR BLD AUTO: 46 % (ref 43–75)
NONHDLC SERPL-MCNC: 138 MG/DL
NRBC BLD AUTO-RTO: 0 /100 WBCS
PLATELET # BLD AUTO: 224 THOUSANDS/UL (ref 149–390)
PMV BLD AUTO: 8.3 FL (ref 8.9–12.7)
POTASSIUM SERPL-SCNC: 4.3 MMOL/L (ref 3.5–5.3)
PROT SERPL-MCNC: 7.8 G/DL (ref 6.4–8.2)
RBC # BLD AUTO: 4.9 MILLION/UL (ref 3.88–5.62)
SODIUM SERPL-SCNC: 142 MMOL/L (ref 136–145)
TRIGL SERPL-MCNC: 110 MG/DL
TSH SERPL DL<=0.05 MIU/L-ACNC: 0.86 UIU/ML (ref 0.36–3.74)
WBC # BLD AUTO: 4.79 THOUSAND/UL (ref 4.31–10.16)

## 2019-08-23 PROCEDURE — 84443 ASSAY THYROID STIM HORMONE: CPT

## 2019-08-23 PROCEDURE — 80061 LIPID PANEL: CPT

## 2019-08-23 PROCEDURE — 36415 COLL VENOUS BLD VENIPUNCTURE: CPT

## 2019-08-23 PROCEDURE — 85025 COMPLETE CBC W/AUTO DIFF WBC: CPT

## 2019-08-23 PROCEDURE — 80053 COMPREHEN METABOLIC PANEL: CPT

## 2019-08-23 PROCEDURE — 82103 ALPHA-1-ANTITRYPSIN TOTAL: CPT

## 2019-08-24 LAB — A1AT SERPL-MCNC: 105 MG/DL (ref 90–200)

## 2019-08-26 ENCOUNTER — APPOINTMENT (OUTPATIENT)
Dept: LAB | Age: 64
End: 2019-08-26
Payer: COMMERCIAL

## 2019-08-26 DIAGNOSIS — R06.00 DOE (DYSPNEA ON EXERTION): ICD-10-CM

## 2019-08-26 DIAGNOSIS — R07.89 CHEST TIGHTNESS: ICD-10-CM

## 2019-08-26 LAB — TROPONIN I SERPL-MCNC: <0.02 NG/ML

## 2019-08-26 PROCEDURE — 36415 COLL VENOUS BLD VENIPUNCTURE: CPT

## 2019-08-26 PROCEDURE — 84484 ASSAY OF TROPONIN QUANT: CPT

## 2019-08-28 ENCOUNTER — HOSPITAL ENCOUNTER (OUTPATIENT)
Dept: NON INVASIVE DIAGNOSTICS | Facility: CLINIC | Age: 64
Discharge: HOME | End: 2019-08-28
Payer: COMMERCIAL

## 2019-08-28 DIAGNOSIS — R07.89 CHEST TIGHTNESS: ICD-10-CM

## 2019-08-28 DIAGNOSIS — R06.00 DOE (DYSPNEA ON EXERTION): ICD-10-CM

## 2019-08-28 PROCEDURE — 93351 STRESS TTE COMPLETE: CPT | Performed by: INTERNAL MEDICINE

## 2019-08-28 PROCEDURE — 93350 STRESS TTE ONLY: CPT

## 2019-08-30 LAB
CHEST PAIN STATEMENT: NORMAL
MAX DIASTOLIC BP: 100 MMHG
MAX HEART RATE: 171 BPM
MAX PREDICTED HEART RATE: 154 BPM
MAX. SYSTOLIC BP: 160 MMHG
PROTOCOL NAME: NORMAL
TARGET HR FORMULA: NORMAL
TIME IN EXERCISE PHASE: NORMAL

## 2019-09-05 ENCOUNTER — OFFICE VISIT (OUTPATIENT)
Dept: INTERNAL MEDICINE CLINIC | Age: 64
End: 2019-09-05
Payer: COMMERCIAL

## 2019-09-05 VITALS
OXYGEN SATURATION: 97 % | TEMPERATURE: 97.7 F | HEART RATE: 84 BPM | HEIGHT: 72 IN | BODY MASS INDEX: 26.87 KG/M2 | WEIGHT: 198.4 LBS | SYSTOLIC BLOOD PRESSURE: 140 MMHG | DIASTOLIC BLOOD PRESSURE: 82 MMHG

## 2019-09-05 DIAGNOSIS — I71.2 THORACIC AORTIC ANEURYSM WITHOUT RUPTURE (HCC): ICD-10-CM

## 2019-09-05 DIAGNOSIS — R94.39 ABNORMAL STRESS TEST: ICD-10-CM

## 2019-09-05 DIAGNOSIS — J43.2 CENTRILOBULAR EMPHYSEMA (HCC): ICD-10-CM

## 2019-09-05 DIAGNOSIS — R06.00 DOE (DYSPNEA ON EXERTION): Primary | ICD-10-CM

## 2019-09-05 DIAGNOSIS — I71.4 ABDOMINAL AORTIC ANEURYSM (AAA) WITHOUT RUPTURE (HCC): ICD-10-CM

## 2019-09-05 DIAGNOSIS — Z77.22 SECOND HAND SMOKE EXPOSURE: ICD-10-CM

## 2019-09-05 PROBLEM — I71.40 ABDOMINAL AORTIC ANEURYSM (AAA) WITHOUT RUPTURE: Status: ACTIVE | Noted: 2019-09-05

## 2019-09-05 PROCEDURE — 99214 OFFICE O/P EST MOD 30 MIN: CPT | Performed by: FAMILY MEDICINE

## 2019-09-05 PROCEDURE — 3008F BODY MASS INDEX DOCD: CPT | Performed by: FAMILY MEDICINE

## 2019-09-05 NOTE — PROGRESS NOTES
Assessment/Plan:    No problem-specific Assessment & Plan notes found for this encounter  Problem List Items Addressed This Visit        Respiratory    Centrilobular emphysema ([address] )       Cardiovascular and Mediastinum    Thoracic aortic aneurysm without rupture [facility])    Relevant Orders    Ambulatory referral to Cardiology    Ambulatory referral to Cardiovascular Surgery    Abdominal aortic aneurysm (AAA) without rupture [facility])    Relevant Orders    Ambulatory referral to Cardiology    Ambulatory referral to Cardiovascular Surgery       Other    MATSON (dyspnea on exertion) - Primary    Relevant Orders    Ambulatory referral to Cardiology    Ambulatory referral to Cardiovascular Surgery    Abnormal stress test    Relevant Orders    Ambulatory referral to Cardiology    Ambulatory referral to Cardiovascular Surgery    Second hand smoke exposure                    Discussion, reviewed stress testing with patient in detail  Known AAA however last CT scan for evaluation was in 2016  At that time it was 4 2 cm and it has grown to 4 8 cm  Stress test borderline positive however patient's shortness of breath is much better with the inhaler  He has emphysema noted on imaging  Will refer to Cardiology as well as cardiovascular surgery  He is agreeable and will make appointment  He is a high risk for having a cardiac problem due to family history and above testing and symptoms  Return in approximately 3-6 months for review and may need PFTs in the future  All questions and concerns addressed with patient today      Subjective:      Patient ID: Sylvia Roman is a 75 y o  male  HPI      from aug 2019 HOP:   Dizziness (pt states that when he bends over and sits back up he is dizzy and lightheaded from sitting or standing position  sxs x3-4 weeks)     Shortness of Breath (pt also c/o MATSON  pt states this is new   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 and just from walking for mail for 150' he feels exhausted and huffing and puffing, tightness in chest, pt also states he has been diaphoretic more than usual upon minimal exertion  sxs x3-4 weeks)     Numbness (pt also c/o numbness and coldness in both feet  sxs x3 months)       Has occasional chest heaviness associated with shortness of breath but no actual chest pain  This is been going on for several weeks to approximately 1 month  It is gradual increase and no inciting events  Quit smoking in 1973 however prior to that was a very mild smoker a few cigarettes a day  Has family history of coronary artery disease in his parents and older brother  Older brother had one-vessel CABG  Patient's last lab work was in January and lipid panel was normal   He has not had any chest trauma changes in his prescriptions or supplementations and does not think he is experiencing extra anxiety  Of note he was on anxiety medication prior to this  No recent trauma  Feels that his sensation is different in his feet when he touches feet they are actually warm  He is active and able to do his activities of daily living without any problems  No sick contacts     sept 2019:  Less shortness of breath with starting Advair on a regular basis  Less dyspnea on exertion  No chest pain and has stress test done and here for results  BMI Counseling: Body mass index is 26 67 kg/m²  Discussed the patient's BMI with him  The BMI is above average  BMI counseling and education was provided to the patient  Nutrition recommendations include reducing portion sizes        The following portions of the patient's history were reviewed and updated as appropriate: allergies, current medications, past family history, past medical history, past social history, past surgical history and problem list     Review of Systems      Constitutional:  Denies fever or chills   Eyes:  Denies change in visual acuity   HENT:  Denies nasal congestion or sore throat   Respiratory:  Denies cough or shortness of breath or wheezing  Cardiovascular:  Denies palpitations or chest pain  GI:  Denies abdominal pain, nausea, or vomiting  Integument:  Denies rash   Neurologic:  Denies headache or focal weakness        Objective:      /82 (BP Location: Left arm, Patient Position: Sitting, Cuff Size: Adult)   Pulse 84   Temp 97 7 °F (36 5 °C) (Tympanic)   Ht 6' 0 32" (1 837 m)   Wt 90 kg (198 lb 6 4 oz)   SpO2 97%   BMI 26 67 kg/m²          Physical Exam      Constitutional:  Well developed, well nourished, no acute distress, non-toxic appearance   Eyes:  PERRL, conjunctiva normal , non icteric sclera  HENT:  Atraumatic, oropharynx moist  Neck-  supple   Respiratory:  CTA b/l, normal breath sounds, no rales, no wheezing   Cardiovascular:  RRR, no murmurs, no LE edema b/l  GI:  Soft, nondistended, normal bowel sounds x 4, nontender, no organomegaly, no mass, no rebound, no guarding   Neurologic:  no focal deficits noted , pulses good in feet bilaterally  Psychiatric:  Speech and behavior appropriate , AAO x 3    The 10-year ASCVD risk score (Veronika Burks et al , 2013) is: 14 8%    Values used to calculate the score:      Age: 75 years      Sex: Male      Is Non- : No      Diabetic: No      Tobacco smoker: No      Systolic Blood Pressure: 928 mmHg      Is BP treated: No      HDL Cholesterol: 54 mg/dL      Total Cholesterol: 192 mg/dL

## 2019-09-06 ENCOUNTER — TELEPHONE (OUTPATIENT)
Dept: INTERNAL MEDICINE CLINIC | Age: 64
End: 2019-09-06

## 2019-09-06 DIAGNOSIS — I71.2 THORACIC AORTIC ANEURYSM WITHOUT RUPTURE (HCC): ICD-10-CM

## 2019-09-06 DIAGNOSIS — I71.4 ABDOMINAL AORTIC ANEURYSM (AAA) WITHOUT RUPTURE (HCC): Primary | ICD-10-CM

## 2019-09-06 NOTE — TELEPHONE ENCOUNTER
Health Maintenance Summary     Topic Due On Due Status Completed On Postpone Until Reason    Colorectal Cancer Screening - Colonoscopy Jul 10, 2019 Not Due Jul 10, 2009      IMMUNIZATION - DTaP/Tdap/Td Feb 9, 2026 Not Due Feb 9, 2016      Immunization-Influenza Sep 1, 2017 Postponed  Apr 1, 2018 Patient Refused    Depression Screening Feb 14, 2018 Due On Feb 14, 2017      Hepatitis C Screening  Completed Feb 9, 2016            Patient is up to date, no discussion needed . received message from [address] [place] [address] Rd; [address] [facility]   Cc: Luz Marina Biswas, DO             We have received your referral for this patient for his aortic aneurysm  Before we are able to schedule him for a consultation with our office, we will need him to have a CTA Chest done so we can determine the size of the aneurysm  Please let our office know when this is scheduled and we will coordinate an appointment for him  Thank you,   Kayley Mcgarry   Surgical Coordinator      Please order test for pt

## 2019-09-06 NOTE — TELEPHONE ENCOUNTER
Left message for pt needs to have test completed prior to be scheduled  an appt     Asked pt to call back if has any questions     Order mailed to pt home

## 2019-09-16 ENCOUNTER — HOSPITAL ENCOUNTER (OUTPATIENT)
Dept: RADIOLOGY | Facility: IMAGING CENTER | Age: 64
Discharge: HOME | End: 2019-09-16
Payer: COMMERCIAL

## 2019-09-16 DIAGNOSIS — I71.2 THORACIC AORTIC ANEURYSM WITHOUT RUPTURE (HCC): ICD-10-CM

## 2019-09-16 PROCEDURE — 71275 CT ANGIOGRAPHY CHEST: CPT

## 2019-09-16 RX ADMIN — IOHEXOL 85 ML: 350 INJECTION, SOLUTION INTRAVENOUS at 08:30

## 2019-10-17 DIAGNOSIS — J43.2 CENTRILOBULAR EMPHYSEMA (HCC): ICD-10-CM

## 2019-10-18 DIAGNOSIS — J43.2 CENTRILOBULAR EMPHYSEMA (HCC): ICD-10-CM

## 2020-03-16 DIAGNOSIS — J43.2 CENTRILOBULAR EMPHYSEMA (HCC): ICD-10-CM

## 2020-04-15 ENCOUNTER — TELEPHONE (OUTPATIENT)
Dept: INTERNAL MEDICINE CLINIC | Age: 65
End: 2020-04-15

## 2021-03-10 DIAGNOSIS — Z23 ENCOUNTER FOR IMMUNIZATION: ICD-10-CM

## 2021-04-23 ENCOUNTER — VBI (OUTPATIENT)
Dept: ADMINISTRATIVE | Facility: OTHER | Age: 66
End: 2021-04-23

## 2021-05-20 ENCOUNTER — RA CDI HCC (OUTPATIENT)
Dept: OTHER | Facility: HOSPITAL | Age: 66
End: 2021-05-20

## 2021-05-20 NOTE — PROGRESS NOTES
[facility]  coding opportunities          Chart reviewed, no opportunity found: CHART REVIEWED, NO OPPORTUNITY FOUND              Patients insurance company: [employer]

## 2021-05-24 ENCOUNTER — OFFICE VISIT (OUTPATIENT)
Dept: INTERNAL MEDICINE CLINIC | Age: 66
End: 2021-05-24
Payer: COMMERCIAL

## 2021-05-24 VITALS
BODY MASS INDEX: 24.92 KG/M2 | TEMPERATURE: 99 F | OXYGEN SATURATION: 98 % | WEIGHT: 188 LBS | DIASTOLIC BLOOD PRESSURE: 68 MMHG | HEIGHT: 73 IN | SYSTOLIC BLOOD PRESSURE: 134 MMHG | HEART RATE: 80 BPM

## 2021-05-24 DIAGNOSIS — Z00.00 WELCOME TO MEDICARE PREVENTIVE VISIT: Primary | ICD-10-CM

## 2021-05-24 DIAGNOSIS — Z12.5 SCREENING PSA (PROSTATE SPECIFIC ANTIGEN): ICD-10-CM

## 2021-05-24 DIAGNOSIS — Z13.220 SCREENING FOR HYPERLIPIDEMIA: ICD-10-CM

## 2021-05-24 DIAGNOSIS — J43.2 CENTRILOBULAR EMPHYSEMA (HCC): ICD-10-CM

## 2021-05-24 DIAGNOSIS — Z23 NEED FOR 23-POLYVALENT PNEUMOCOCCAL POLYSACCHARIDE VACCINE: ICD-10-CM

## 2021-05-24 DIAGNOSIS — I71.4 ABDOMINAL AORTIC ANEURYSM (AAA) WITHOUT RUPTURE (HCC): ICD-10-CM

## 2021-05-24 DIAGNOSIS — I71.2 THORACIC ASCENDING AORTIC ANEURYSM (HCC): ICD-10-CM

## 2021-05-24 DIAGNOSIS — Z77.22 SECOND HAND SMOKE EXPOSURE: ICD-10-CM

## 2021-05-24 DIAGNOSIS — F41.9 ANXIETY: ICD-10-CM

## 2021-05-24 DIAGNOSIS — I71.2 THORACIC AORTIC ANEURYSM WITHOUT RUPTURE (HCC): ICD-10-CM

## 2021-05-24 DIAGNOSIS — Z13.220 SCREENING CHOLESTEROL LEVEL: ICD-10-CM

## 2021-05-24 PROBLEM — R94.39 ABNORMAL STRESS TEST: Status: RESOLVED | Noted: 2019-09-05 | Resolved: 2021-05-24

## 2021-05-24 PROBLEM — R06.00 DOE (DYSPNEA ON EXERTION): Status: RESOLVED | Noted: 2019-08-19 | Resolved: 2021-05-24

## 2021-05-24 PROBLEM — H25.9 AGE-RELATED CATARACT OF BOTH EYES: Status: RESOLVED | Noted: 2018-12-07 | Resolved: 2021-05-24

## 2021-05-24 PROBLEM — I71.21 THORACIC ASCENDING AORTIC ANEURYSM: Status: ACTIVE | Noted: 2021-05-24

## 2021-05-24 PROBLEM — R06.09 DOE (DYSPNEA ON EXERTION): Status: RESOLVED | Noted: 2019-08-19 | Resolved: 2021-05-24

## 2021-05-24 PROBLEM — Z86.69 HISTORY OF CATARACT: Status: ACTIVE | Noted: 2021-05-24

## 2021-05-24 PROCEDURE — 99214 OFFICE O/P EST MOD 30 MIN: CPT | Performed by: FAMILY MEDICINE

## 2021-05-24 PROCEDURE — G0439 PPPS, SUBSEQ VISIT: HCPCS | Performed by: FAMILY MEDICINE

## 2021-05-24 PROCEDURE — 1036F TOBACCO NON-USER: CPT | Performed by: FAMILY MEDICINE

## 2021-05-24 PROCEDURE — G0009 ADMIN PNEUMOCOCCAL VACCINE: HCPCS | Performed by: FAMILY MEDICINE

## 2021-05-24 PROCEDURE — 4040F PNEUMOC VAC/ADMIN/RCVD: CPT | Performed by: FAMILY MEDICINE

## 2021-05-24 PROCEDURE — 3008F BODY MASS INDEX DOCD: CPT | Performed by: FAMILY MEDICINE

## 2021-05-24 PROCEDURE — 3288F FALL RISK ASSESSMENT DOCD: CPT | Performed by: FAMILY MEDICINE

## 2021-05-24 PROCEDURE — 1160F RVW MEDS BY RX/DR IN RCRD: CPT | Performed by: FAMILY MEDICINE

## 2021-05-24 PROCEDURE — 90732 PPSV23 VACC 2 YRS+ SUBQ/IM: CPT | Performed by: FAMILY MEDICINE

## 2021-05-24 PROCEDURE — 1170F FXNL STATUS ASSESSED: CPT | Performed by: FAMILY MEDICINE

## 2021-05-24 PROCEDURE — 3725F SCREEN DEPRESSION PERFORMED: CPT | Performed by: FAMILY MEDICINE

## 2021-05-24 PROCEDURE — 1125F AMNT PAIN NOTED PAIN PRSNT: CPT | Performed by: FAMILY MEDICINE

## 2021-05-24 RX ORDER — CYANOCOBALAMIN (VITAMIN B-12) 500 MCG
500 TABLET ORAL DAILY
COMMUNITY

## 2021-05-24 RX ORDER — ESCITALOPRAM OXALATE 10 MG/1
10 TABLET ORAL DAILY
Qty: 90 TABLET | Refills: 1 | Status: SHIPPED | OUTPATIENT
Start: 2021-05-24 | End: 2021-11-08 | Stop reason: SDUPTHER

## 2021-05-24 NOTE — PROGRESS NOTES
Assessment/Plan:    1  Welcome to Medicare preventive visit    2  Anxiety  -     escitalopram (LEXAPRO) 10 mg tablet; Take 1 tablet (10 mg total) by mouth daily  -     Comprehensive metabolic panel; Future  -     CBC and differential; Future    3  Centrilobular emphysema (HCC)  -     fluticasone-salmeterol (Advair Diskus) 100-50 mcg/dose inhaler; Inhale 1 puff 2 (two) times a day Rinse mouth after use  4  Abdominal aortic aneurysm (AAA) without rupture (HCC)  -     CT chest w contrast; Future; Expected date: 05/24/2021    5  Screening PSA (prostate specific antigen)  -     PSA, Total Screen; Future    6  Screening cholesterol level  -     Lipid panel; Future    7  Thoracic ascending aortic aneurysm ([facility] )    8  Thoracic aortic aneurysm without rupture ([facility] )    9  Second hand smoke exposure    10  Screening for hyperlipidemia            Patient Instructions       Medicare Preventive Visit Patient Instructions  Thank you for completing your Welcome to Medicare Visit or Medicare Annual Wellness Visit today  Your next wellness visit will be due in one year (5/25/2022)  The screening/preventive services that you may require over the next 5-10 years are detailed below  Some tests may not apply to you based off risk factors and/or age  Screening tests ordered at today's visit but not completed yet may show as past due  Also, please note that scanned in results may not display below  Preventive Screenings:  Service Recommendations Previous Testing/Comments   Colorectal Cancer Screening  · Colonoscopy    · Fecal Occult Blood Test (FOBT)/Fecal Immunochemical Test (FIT)  · Fecal DNA/Cologuard Test  · Flexible Sigmoidoscopy Age: 54-65 years old   Colonoscopy: every 10 years (May be performed more frequently if at higher risk)  OR  FOBT/FIT: every 1 year  OR  Cologuard: every 3 years  OR  Sigmoidoscopy: every 5 years  Screening may be recommended earlier than age 48 if at higher risk for colorectal cancer   Also, an individualized decision between you and your healthcare provider will decide whether screening between the ages of 74-80 would be appropriate  Colonoscopy: 12/02/2015  FOBT/FIT: Not on file  Cologuard: Not on file  Sigmoidoscopy: Not on file    Screening Current     Prostate Cancer Screening Individualized decision between patient and health care provider in men between ages of 53-78   Medicare will cover every 12 months beginning on the day after your 50th birthday PSA: 0 9 ng/mL           Hepatitis C Screening Once for adults born between 1945 and 1965  More frequently in patients at high risk for Hepatitis C Hep C Antibody: 01/17/2019    Screening Current   Diabetes Screening 1-2 times per year if you're at risk for diabetes or have pre-diabetes Fasting glucose: 103 mg/dL   A1C: No results in last 5 years        Cholesterol Screening Once every 5 years if you don't have a lipid disorder  May order more often based on risk factors  Lipid panel: 08/23/2019    Screening Current      Other Preventive Screenings Covered by Medicare:  1  Abdominal Aortic Aneurysm (AAA) Screening: covered once if your at risk  You're considered to be at risk if you have a family history of AAA or a male between the age of 73-68 who smoking at least 100 cigarettes in your lifetime  2  Lung Cancer Screening: covers low dose CT scan once per year if you meet all of the following conditions: (1) Age 50-69; (2) No signs or symptoms of lung cancer; (3) Current smoker or have quit smoking within the last 15 years; (4) You have a tobacco smoking history of at least 30 pack years (packs per day x number of years you smoked); (5) You get a written order from a healthcare provider  3  Glaucoma Screening: covered annually if you're considered high risk: (1) You have diabetes OR (2) Family history of glaucoma OR (3)  aged 48 and older OR (3)  American aged 72 and older  3   Osteoporosis Screening: covered every 2 years if you meet one of the following conditions: (1) Have a vertebral abnormality; (2) On glucocorticoid therapy for more than 3 months; (3) Have primary hyperparathyroidism; (4) On osteoporosis medications and need to assess response to drug therapy  5  HIV Screening: covered annually if you're between the age of 12-76  Also covered annually if you are younger than 13 and older than 72 with risk factors for HIV infection  For pregnant patients, it is covered up to 3 times per pregnancy  Immunizations:  Immunization Recommendations   Influenza Vaccine Annual influenza vaccination during flu season is recommended for all persons aged >= 6 months who do not have contraindications   Pneumococcal Vaccine (Prevnar and Pneumovax)  * Prevnar = PCV13  * Pneumovax = PPSV23 Adults 25-60 years old: 1-3 doses may be recommended based on certain risk factors  Adults 72 years old: Prevnar (PCV13) vaccine recommended followed by Pneumovax (PPSV23) vaccine  If already received PPSV23 since turning 65, then PCV13 recommended at least one year after PPSV23 dose  Hepatitis B Vaccine 3 dose series if at intermediate or high risk (ex: diabetes, end stage renal disease, liver disease)   Tetanus (Td) Vaccine - COST NOT COVERED BY MEDICARE PART B Following completion of primary series, a booster dose should be given every 10 years to maintain immunity against tetanus  Td may also be given as tetanus wound prophylaxis  Tdap Vaccine - COST NOT COVERED BY MEDICARE PART B Recommended at least once for all adults  For pregnant patients, recommended with each pregnancy     Shingles Vaccine (Shingrix) - COST NOT COVERED BY MEDICARE PART B  2 shot series recommended in those aged 48 and above     Health Maintenance Due:      Topic Date Due    Colonoscopy Surveillance  12/02/2018    Colorectal Cancer Screening  12/02/2025    Hepatitis C Screening  Completed     Immunizations Due:      Topic Date Due    COVID-19 Vaccine (1) Never done   Sidra Pham DTaP,Tdap,and Td Vaccines (1 - Tdap) Never done    Pneumococcal Vaccine: 65+ Years (2 of 2 - PPSV23) 01/28/2020     Advance Directives   What are advance directives? Advance directives are legal documents that state your wishes and plans for medical care  These plans are made ahead of time in case you lose your ability to make decisions for yourself  Advance directives can apply to any medical decision, such as the treatments you want, and if you want to donate organs  What are the types of advance directives? There are many types of advance directives, and each state has rules about how to use them  You may choose a combination of any of the following:  · Living will: This is a written record of the treatment you want  You can also choose which treatments you do not want, which to limit, and which to stop at a certain time  This includes surgery, medicine, IV fluid, and tube feedings  · Durable power of  for healthcare [facility]): This is a written record that states who you want to make healthcare choices for you when you are unable to make them for yourself  This person, called a proxy, is usually a family member or a friend  You may choose more than 1 proxy  · Do not resuscitate (DNR) order:  A DNR order is used in case your heart stops beating or you stop breathing  It is a request not to have certain forms of treatment, such as CPR  A DNR order may be included in other types of advance directives  · Medical directive: This covers the care that you want if you are in a coma, near death, or unable to make decisions for yourself  You can list the treatments you want for each condition  Treatment may include pain medicine, surgery, blood transfusions, dialysis, IV or tube feedings, and a ventilator (breathing machine)  · Values history: This document has questions about your views, beliefs, and how you feel and think about life   This information can help others choose the care that you would choose  Why are advance directives important? An advance directive helps you control your care  Although spoken wishes may be used, it is better to have your wishes written down  Spoken wishes can be misunderstood, or not followed  Treatments may be given even if you do not want them  An advance directive may make it easier for your family to make difficult choices about your care  © Copyright [employer] 2018 Information is for End User's use only and may not be sold, redistributed or otherwise used for commercial purposes  All illustrations and images included in CareNotes® are the copyrighted property of A D A M , Inc  or [address]      Return in about 6 months (around 11/24/2021) for Recheck  Subjective:      Patient ID: Juan Diego Alexis is a 77 y o  male  Chief Complaint   Patient presents with    Follow-up     emphysema - pt did FIT test 10/5/20 - negative - will send to [facility]     Medicare Wellness Visit     AWV- SUB       HPI    Thoracic ascending aorta aneurysm  Stable at 4 cm on CT scan done in 2019  He is due for follow-up  No symptoms  COPD  Smoking history for 1 year but significant secondhand smoke exposure  Takes his Advair but only daily now and feels well  No breakthrough symptoms of shortness of breath or wheezing or coughing  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 without any issues  Anxiety, taking Lexapro half tablet at 5 mg daily without any breakthrough symptoms  No HI or SI and sleeping well  Does not feel that he needs any changes      The following portions of the patient's history were reviewed and updated as appropriate: allergies, current medications, past family history, past medical history, past social history, past surgical history and problem list     Review of Systems      Constitutional:  Denies fever or chills   Eyes:  Denies double or blurry vision  HENT:  Denies nasal congestion or sore throat Respiratory:  Denies cough or shortness of breath or wheezing  Cardiovascular:  Denies palpitations or chest pain  GI:  Denies abdominal pain, nausea, or vomiting, no loose stools  Integument:  Denies rash , no open areas  Neurologic:  Denies headache or focal weakness, no dizziness        Current Outpatient Medications   Medication Sig Dispense Refill    Cyanocobalamin (Vitamin B 12) 500 MCG TABS Take 500 mcg by mouth daily      escitalopram (LEXAPRO) 10 mg tablet Take 1 tablet (10 mg total) by mouth daily 90 tablet 1    fluticasone-salmeterol (Advair Diskus) 100-50 mcg/dose inhaler Inhale 1 puff 2 (two) times a day Rinse mouth after use  3 each 1    Misc Natural Products (Osteo Bi-Flex Triple Strength) TABS Take by mouth daily      Multiple Vitamins-Minerals (MULTIVITAMIN ADULT PO) Take 1 tablet by mouth daily       No current facility-administered medications for this visit          Objective:    /68 (BP Location: Left arm, Patient Position: Sitting, Cuff Size: Standard)   Pulse 80   Temp 99 °F (37 2 °C) (Temporal)   Ht 6' 1" (1 854 m)   Wt 85 3 kg (188 lb)   SpO2 98%   BMI 24 80 kg/m²        Physical Exam         Constitutional:  Well developed, well nourished, no acute distress, non-toxic appearance   Eyes:  PERRL, conjunctiva normal , non icteric sclera  HENT:  Atraumatic, oropharynx moist  Neck-  supple   Respiratory:  CTA b/l, normal breath sounds, no rales, no wheezing   Cardiovascular:  RRR, no murmurs, no LE edema b/l  GI:  Soft, nondistended, normal bowel sounds x 4, nontender, no organomegaly, no mass, no rebound, no guarding   Neurologic:  no focal deficits noted   Psychiatric:  Speech and behavior appropriate , AAO x 3      Iline Bullion, DO

## 2021-05-24 NOTE — PATIENT INSTRUCTIONS
Medicare Preventive Visit Patient Instructions  Thank you for completing your Welcome to Medicare Visit or Medicare Annual Wellness Visit today  Your next wellness visit will be due in one year (5/25/2022)  The screening/preventive services that you may require over the next 5-10 years are detailed below  Some tests may not apply to you based off risk factors and/or age  Screening tests ordered at today's visit but not completed yet may show as past due  Also, please note that scanned in results may not display below  Preventive Screenings:  Service Recommendations Previous Testing/Comments   Colorectal Cancer Screening  · Colonoscopy    · Fecal Occult Blood Test (FOBT)/Fecal Immunochemical Test (FIT)  · Fecal DNA/Cologuard Test  · Flexible Sigmoidoscopy Age: 54-65 years old   Colonoscopy: every 10 years (May be performed more frequently if at higher risk)  OR  FOBT/FIT: every 1 year  OR  Cologuard: every 3 years  OR  Sigmoidoscopy: every 5 years  Screening may be recommended earlier than age 48 if at higher risk for colorectal cancer  Also, an individualized decision between you and your healthcare provider will decide whether screening between the ages of 74-80 would be appropriate   Colonoscopy: 12/02/2015  FOBT/FIT: Not on file  Cologuard: Not on file  Sigmoidoscopy: Not on file    Screening Current     Prostate Cancer Screening Individualized decision between patient and health care provider in men between ages of 53-78   Medicare will cover every 12 months beginning on the day after your 50th birthday PSA: 0 9 ng/mL           Hepatitis C Screening Once for adults born between 1945 and 1965  More frequently in patients at high risk for Hepatitis C Hep C Antibody: 01/17/2019    Screening Current   Diabetes Screening 1-2 times per year if you're at risk for diabetes or have pre-diabetes Fasting glucose: 103 mg/dL   A1C: No results in last 5 years        Cholesterol Screening Once every 5 years if you don't have a lipid disorder  May order more often based on risk factors  Lipid panel: 08/23/2019    Screening Current      Other Preventive Screenings Covered by Medicare:  1  Abdominal Aortic Aneurysm (AAA) Screening: covered once if your at risk  You're considered to be at risk if you have a family history of AAA or a male between the age of 73-68 who smoking at least 100 cigarettes in your lifetime  2  Lung Cancer Screening: covers low dose CT scan once per year if you meet all of the following conditions: (1) Age 50-69; (2) No signs or symptoms of lung cancer; (3) Current smoker or have quit smoking within the last 15 years; (4) You have a tobacco smoking history of at least 30 pack years (packs per day x number of years you smoked); (5) You get a written order from a healthcare provider  3  Glaucoma Screening: covered annually if you're considered high risk: (1) You have diabetes OR (2) Family history of glaucoma OR (3)  aged 48 and older OR (3)  American aged 72 and older  3  Osteoporosis Screening: covered every 2 years if you meet one of the following conditions: (1) Have a vertebral abnormality; (2) On glucocorticoid therapy for more than 3 months; (3) Have primary hyperparathyroidism; (4) On osteoporosis medications and need to assess response to drug therapy  5  HIV Screening: covered annually if you're between the age of 12-76  Also covered annually if you are younger than 13 and older than 72 with risk factors for HIV infection  For pregnant patients, it is covered up to 3 times per pregnancy      Immunizations:  Immunization Recommendations   Influenza Vaccine Annual influenza vaccination during flu season is recommended for all persons aged >= 6 months who do not have contraindications   Pneumococcal Vaccine (Prevnar and Pneumovax)  * Prevnar = PCV13  * Pneumovax = PPSV23 Adults 25-60 years old: 1-3 doses may be recommended based on certain risk factors  Adults 72 years old: Prevnar (PCV13) vaccine recommended followed by Pneumovax (PPSV23) vaccine  If already received PPSV23 since turning 65, then PCV13 recommended at least one year after PPSV23 dose  Hepatitis B Vaccine 3 dose series if at intermediate or high risk (ex: diabetes, end stage renal disease, liver disease)   Tetanus (Td) Vaccine - COST NOT COVERED BY MEDICARE PART B Following completion of primary series, a booster dose should be given every 10 years to maintain immunity against tetanus  Td may also be given as tetanus wound prophylaxis  Tdap Vaccine - COST NOT COVERED BY MEDICARE PART B Recommended at least once for all adults  For pregnant patients, recommended with each pregnancy  Shingles Vaccine (Shingrix) - COST NOT COVERED BY MEDICARE PART B  2 shot series recommended in those aged 48 and above     Health Maintenance Due:      Topic Date Due    Colonoscopy Surveillance  12/02/2018    Colorectal Cancer Screening  12/02/2025    Hepatitis C Screening  Completed     Immunizations Due:      Topic Date Due    COVID-19 Vaccine (1) Never done    DTaP,Tdap,and Td Vaccines (1 - Tdap) Never done    Pneumococcal Vaccine: 65+ Years (2 of 2 - PPSV23) 01/28/2020     Advance Directives   What are advance directives? Advance directives are legal documents that state your wishes and plans for medical care  These plans are made ahead of time in case you lose your ability to make decisions for yourself  Advance directives can apply to any medical decision, such as the treatments you want, and if you want to donate organs  What are the types of advance directives? There are many types of advance directives, and each state has rules about how to use them  You may choose a combination of any of the following:  · Living will: This is a written record of the treatment you want  You can also choose which treatments you do not want, which to limit, and which to stop at a certain time   This includes surgery, medicine, IV fluid, and tube feedings  · Durable power of  for healthcare [facility]): This is a written record that states who you want to make healthcare choices for you when you are unable to make them for yourself  This person, called a proxy, is usually a family member or a friend  You may choose more than 1 proxy  · Do not resuscitate (DNR) order:  A DNR order is used in case your heart stops beating or you stop breathing  It is a request not to have certain forms of treatment, such as CPR  A DNR order may be included in other types of advance directives  · Medical directive: This covers the care that you want if you are in a coma, near death, or unable to make decisions for yourself  You can list the treatments you want for each condition  Treatment may include pain medicine, surgery, blood transfusions, dialysis, IV or tube feedings, and a ventilator (breathing machine)  · Values history: This document has questions about your views, beliefs, and how you feel and think about life  This information can help others choose the care that you would choose  Why are advance directives important? An advance directive helps you control your care  Although spoken wishes may be used, it is better to have your wishes written down  Spoken wishes can be misunderstood, or not followed  Treatments may be given even if you do not want them  An advance directive may make it easier for your family to make difficult choices about your care  © Copyright [address] 2018 Information is for End User's use only and may not be sold, redistributed or otherwise used for commercial purposes   All illustrations and images included in CareNotes® are the copyrighted property of A D A M , Inc  or [address] [place] [address] [facility]

## 2021-05-24 NOTE — PROGRESS NOTES
Assessment and Plan:     Problem List Items Addressed This Visit        Respiratory    Centrilobular emphysema ([address] )       Other    Anxiety          [place] 2015 due 3-5yrs, FIT 10,2020 neg  PSA 1,2019 due  Eye UTd   dental UTD  PNA 23 due  COvid- done       Preventive health issues were discussed with patient, and age appropriate screening tests were ordered as noted in patient's After Visit Summary  Personalized health advice and appropriate referrals for health education or preventive services given if needed, as noted in patient's After Visit Summary       History of Present Illness:     Patient presents for Medicare Annual Wellness visit    Patient Care Team:  Monica George DO as PCP - General  MD Adam Garza MD     Problem List:     Patient Active Problem List   Diagnosis    Adrenal adenoma    Anxiety    Thoracic aortic aneurysm without rupture [facility])    Testicular cyst    Need for hepatitis C screening test    Screening for hyperlipidemia    Screening for prostate cancer    Age-related cataract of both eyes    Fuchs' corneal dystrophy    Welcome to Medicare preventive visit    MATSON (dyspnea on exertion)    Centrilobular emphysema ([address] )    Abdominal aortic aneurysm (AAA) without rupture ([address] )    Abnormal stress test    Second hand smoke exposure      Past Medical and Surgical History:     Past Medical History:   Diagnosis Date    Adrenal adenoma     Anxiety     Arthritis     Cardiac disorder     Cataracts, bilateral     Hearing loss     Polyp of colon     Testicular cyst     Thoracic aortic aneurysm ([address] )      Past Surgical History:   Procedure Laterality Date    APPENDECTOMY      CATARACT EXTRACTION, BILATERAL      HERNIA REPAIR      2    SHOULDER SURGERY Left       Family History:     Family History   Problem Relation Age of Onset    Hypertension Mother     Hypertension Father     Throat cancer Father       Social History:        Social History     Socioeconomic History    Marital status: /Civil Union     Spouse name: None    Number of children: None    Years of education: None    Highest education level: None   Occupational History    None   Social Needs    Financial resource strain: None    Food insecurity     Worry: None     Inability: None    Transportation needs     Medical: None     Non-medical: None   Tobacco Use    Smoking status: Former Smoker     Types: Cigarettes     Quit date:      Years since quittin 4    Smokeless tobacco: Never Used   Substance and Sexual Activity    Alcohol use: Yes     Comment: occasional    Drug use: No    Sexual activity: None   Lifestyle    Physical activity     Days per week: None     Minutes per session: None    Stress: None   Relationships    Social connections     Talks on phone: None     Gets together: None     Attends [employer] service: None     Active member of club or organization: None     Attends meetings of clubs or organizations: None     Relationship status: None    Intimate partner violence     Fear of current or ex partner: None     Emotionally abused: None     Physically abused: None     Forced sexual activity: None   Other Topics Concern    None   Social History Narrative    None      Medications and Allergies:     Current Outpatient Medications   Medication Sig Dispense Refill    Cyanocobalamin (Vitamin B 12) 500 MCG TABS Take 500 mcg by mouth daily      escitalopram (LEXAPRO) 10 mg tablet Take 1 tablet (10 mg total) by mouth daily 30 tablet 5    fluticasone-salmeterol (Advair Diskus) 100-50 mcg/dose inhaler Inhale 1 puff 2 (two) times a day Rinse mouth after use  1 Inhaler 5    Misc Natural Products (Osteo Bi-Flex Triple Strength) TABS Take by mouth daily      Multiple Vitamins-Minerals (MULTIVITAMIN ADULT PO) Take 1 tablet by mouth daily       No current facility-administered medications for this visit        No Known Allergies   Immunizations:     Immunization History Administered Date(s) Administered    INFLUENZA 11/04/2018, 11/08/2020    Pneumococcal Conjugate 13-Valent 01/28/2019    Zoster 01/14/2016      Health Maintenance:         Topic Date Due    Colonoscopy Surveillance  12/02/2018    Colorectal Cancer Screening  12/02/2025    Hepatitis C Screening  Completed         Topic Date Due    COVID-19 Vaccine (1) Never done    DTaP,Tdap,and Td Vaccines (1 - Tdap) Never done    Pneumococcal Vaccine: 65+ Years (2 of 2 - PPSV23) 01/28/2020      Medicare Health Risk Assessment:     /68 (BP Location: Left arm, Patient Position: Sitting, Cuff Size: Standard)   Pulse 80   Temp 99 °F (37 2 °C) (Temporal)   Ht 6' 1" (1 854 m)   Wt 85 3 kg (188 lb)   SpO2 98%   BMI 24 80 kg/m²      Mallory Reyes is here for his Subsequent Wellness visit  Health Risk Assessment:   Patient rates overall health as very good  Patient feels that their physical health rating is same  Patient is satisfied with their life  Eyesight was rated as same  Hearing was rated as slightly worse  Patient feels that their emotional and mental health rating is same  Patients states they are never, rarely angry  Patient states they are never, rarely unusually tired/fatigued  Pain experienced in the last 7 days has been none  Patient states that he has experienced no weight loss or gain in last 6 months  Depression Screening:   PHQ-2 Score: 1      Fall Risk Screening: In the past year, patient has experienced: no history of falling in past year      Home Safety:  Patient does not have trouble with stairs inside or outside of their home  Patient has working smoke alarms and has working carbon monoxide detector  Home safety hazards include: none  Nutrition:   Current diet is Regular and Limited junk food  Medications:   Patient is not currently taking any over-the-counter supplements  Patient is able to manage medications       Activities of Daily Living (ADLs)/Instrumental Activities of Daily Living (IADLs):   Walk and transfer into and out of bed and chair?: Yes  Dress and groom yourself?: Yes    Bathe or shower yourself?: Yes    Feed yourself? Yes  Do your laundry/housekeeping?: Yes  Manage your money, pay your bills and track your expenses?: Yes  Make your own meals?: Yes    Do your own shopping?: Yes    Previous Hospitalizations:   Any hospitalizations or ED visits within the last 12 months?: No      Advance Care Planning:   Living will: Yes    Advanced directive: Yes      Comments: His wife Gema Mention    Cognitive Screening:   Provider or family/friend/caregiver concerned regarding cognition?: No    PREVENTIVE SCREENINGS      Cardiovascular Screening:    General: Screening Current      Colorectal Cancer Screening:     General: Screening Current      Abdominal Aortic Aneurysm (AAA) Screening:    Risk factors include: age between 73-67 yo and tobacco use        General: Screening Not Indicated and History AAA      Lung Cancer Screening:     General: Screening Not Indicated      Hepatitis C Screening:    General: Screening Current    Screening, Brief Intervention, and Referral to Treatment (SBIRT)    Screening      Single Item Drug Screening:  How often have you used an illegal drug (including marijuana) or a prescription medication for non-medical reasons in the past year? never    Single Item Drug Screen Score: 0  Interpretation: Negative screen for possible drug use disorder    Brief Intervention  Alcohol & drug use screenings were reviewed  No concerns regarding substance use disorder identified  Other Counseling Topics:   Car/seat belt/driving safety, skin self-exam, sunscreen and regular weightbearing exercise and calcium and vitamin D intake         Mecca Code, DO

## 2021-05-25 ENCOUNTER — TELEPHONE (OUTPATIENT)
Dept: ADMINISTRATIVE | Facility: OTHER | Age: 66
End: 2021-05-25

## 2021-05-25 NOTE — TELEPHONE ENCOUNTER
----- Message from Vladimir Ramírez sent at 5/24/2021  1:36 PM EDT -----  Regarding: FIT test  05/24/21 1:38 PM    Hello, our patient Maal Davis has had CRC: FIT/FOBTx3 completed/performed  Please assist in updating the patient chart by making an External outreach to ##in chart # facility located in 10/5/20   The date of service is 10/5/20    Thank you,  Vladimir Gabriel

## 2021-05-25 NOTE — TELEPHONE ENCOUNTER
Upon review of the In Basket request we were able to locate, review, and update the patient chart as requested for CRC: FIT/FOBT (3)  Any additional questions or concerns should be emailed to the Practice Liaisons via [email or web address] email, please do not reply via In Basket      Thank you  Grayce Hodgkin, MA

## 2021-05-27 ENCOUNTER — APPOINTMENT (OUTPATIENT)
Dept: LAB | Facility: IMAGING CENTER | Age: 66
End: 2021-05-27
Payer: COMMERCIAL

## 2021-05-27 DIAGNOSIS — Z12.5 SCREENING PSA (PROSTATE SPECIFIC ANTIGEN): ICD-10-CM

## 2021-05-27 DIAGNOSIS — Z13.220 SCREENING CHOLESTEROL LEVEL: ICD-10-CM

## 2021-05-27 DIAGNOSIS — F41.9 ANXIETY: ICD-10-CM

## 2021-05-27 LAB
ALBUMIN SERPL BCP-MCNC: 4 G/DL (ref 3.5–5)
ALP SERPL-CCNC: 59 U/L (ref 46–116)
ALT SERPL W P-5'-P-CCNC: 28 U/L (ref 12–78)
ANION GAP SERPL CALCULATED.3IONS-SCNC: 3 MMOL/L (ref 4–13)
AST SERPL W P-5'-P-CCNC: 14 U/L (ref 5–45)
BASOPHILS # BLD AUTO: 0.06 THOUSANDS/ΜL (ref 0–0.1)
BASOPHILS NFR BLD AUTO: 1 % (ref 0–1)
BILIRUB SERPL-MCNC: 0.73 MG/DL (ref 0.2–1)
BUN SERPL-MCNC: 21 MG/DL (ref 5–25)
CALCIUM SERPL-MCNC: 9.5 MG/DL (ref 8.3–10.1)
CHLORIDE SERPL-SCNC: 106 MMOL/L (ref 100–108)
CHOLEST SERPL-MCNC: 183 MG/DL (ref 50–200)
CO2 SERPL-SCNC: 30 MMOL/L (ref 21–32)
CREAT SERPL-MCNC: 0.75 MG/DL (ref 0.6–1.3)
EOSINOPHIL # BLD AUTO: 0.11 THOUSAND/ΜL (ref 0–0.61)
EOSINOPHIL NFR BLD AUTO: 2 % (ref 0–6)
ERYTHROCYTE [DISTWIDTH] IN BLOOD BY AUTOMATED COUNT: 12.5 % (ref 11.6–15.1)
GFR SERPL CREATININE-BSD FRML MDRD: 95 ML/MIN/1.73SQ M
GLUCOSE P FAST SERPL-MCNC: 101 MG/DL (ref 65–99)
HCT VFR BLD AUTO: 44.5 % (ref 36.5–49.3)
HDLC SERPL-MCNC: 53 MG/DL
HGB BLD-MCNC: 14.8 G/DL (ref 12–17)
IMM GRANULOCYTES # BLD AUTO: 0.02 THOUSAND/UL (ref 0–0.2)
IMM GRANULOCYTES NFR BLD AUTO: 0 % (ref 0–2)
LDLC SERPL CALC-MCNC: 112 MG/DL (ref 0–100)
LYMPHOCYTES # BLD AUTO: 2.65 THOUSANDS/ΜL (ref 0.6–4.47)
LYMPHOCYTES NFR BLD AUTO: 49 % (ref 14–44)
MCH RBC QN AUTO: 31.9 PG (ref 26.8–34.3)
MCHC RBC AUTO-ENTMCNC: 33.3 G/DL (ref 31.4–37.4)
MCV RBC AUTO: 96 FL (ref 82–98)
MONOCYTES # BLD AUTO: 0.56 THOUSAND/ΜL (ref 0.17–1.22)
MONOCYTES NFR BLD AUTO: 10 % (ref 4–12)
NEUTROPHILS # BLD AUTO: 2.06 THOUSANDS/ΜL (ref 1.85–7.62)
NEUTS SEG NFR BLD AUTO: 38 % (ref 43–75)
NONHDLC SERPL-MCNC: 130 MG/DL
NRBC BLD AUTO-RTO: 0 /100 WBCS
PLATELET # BLD AUTO: 241 THOUSANDS/UL (ref 149–390)
PMV BLD AUTO: 8.4 FL (ref 8.9–12.7)
POTASSIUM SERPL-SCNC: 4.4 MMOL/L (ref 3.5–5.3)
PROT SERPL-MCNC: 7.5 G/DL (ref 6.4–8.2)
PSA SERPL-MCNC: 1.4 NG/ML (ref 0–4)
RBC # BLD AUTO: 4.64 MILLION/UL (ref 3.88–5.62)
SODIUM SERPL-SCNC: 139 MMOL/L (ref 136–145)
TRIGL SERPL-MCNC: 92 MG/DL
WBC # BLD AUTO: 5.46 THOUSAND/UL (ref 4.31–10.16)

## 2021-05-27 PROCEDURE — 80061 LIPID PANEL: CPT

## 2021-05-27 PROCEDURE — 80053 COMPREHEN METABOLIC PANEL: CPT

## 2021-05-27 PROCEDURE — 36415 COLL VENOUS BLD VENIPUNCTURE: CPT

## 2021-05-27 PROCEDURE — 85025 COMPLETE CBC W/AUTO DIFF WBC: CPT

## 2021-05-27 PROCEDURE — G0103 PSA SCREENING: HCPCS

## 2021-08-09 ENCOUNTER — OFFICE VISIT (OUTPATIENT)
Dept: PODIATRY | Facility: CLINIC | Age: 66
End: 2021-08-09
Payer: COMMERCIAL

## 2021-08-09 VITALS
HEIGHT: 73 IN | HEART RATE: 68 BPM | DIASTOLIC BLOOD PRESSURE: 88 MMHG | BODY MASS INDEX: 24.76 KG/M2 | SYSTOLIC BLOOD PRESSURE: 148 MMHG | WEIGHT: 186.8 LBS

## 2021-08-09 DIAGNOSIS — M76.821 POSTERIOR TIBIAL TENDINITIS OF RIGHT LOWER EXTREMITY: Primary | ICD-10-CM

## 2021-08-09 PROCEDURE — 99202 OFFICE O/P NEW SF 15 MIN: CPT | Performed by: PODIATRIST

## 2021-08-09 RX ORDER — MELOXICAM 15 MG/1
15 TABLET ORAL DAILY
Qty: 30 TABLET | Refills: 0 | Status: SHIPPED | OUTPATIENT
Start: 2021-08-09 | End: 2021-11-08

## 2021-08-09 NOTE — PROGRESS NOTES
Biomechanical Exam of LE:    Ankle dorsiflexion with knee extended is limited on right and limited on left  Ankle dorsiflexion with knee flexed is able to get pass vertical on right and able to get pass vertical on left  Malleolar positioning is WNL b/l  STJ ROM WNL b/l, heel inversion is approximately 20° on right and 20° on left; heel eversion is approximately 10° on right and 10° on left  1st ray ROM WNL b/l, able to dorsiflex/plantarflex b/l  Tibial varum is 0° b/l, Resting calcaneal stance position is valgus and approximately 3° on right and vertical and approximately 2° on left  Gait analysis: pronated  Gross deformity noted: pes planus

## 2021-08-10 NOTE — PROGRESS NOTES
Assessment/Plan:    Explained the patient that his symptoms are most consistent with posterior tibial tendinitis of the right foot  Patient was placed on meloxicam 15 mg daily  Biomechanical exam was performed by  Dr Chiki Olivas a podiatric resident  No problem-specific Assessment & Plan notes found for this encounter  Diagnoses and all orders for this visit:    Posterior tibial tendinitis of right lower extremity  -     meloxicam (MOBIC) 15 mg tablet; Take 1 tablet (15 mg total) by mouth daily          Subjective:      Patient ID: Terry Dhaliwal is a 74 y o  male  HPI     Patient, a 58-year-old male presents with pain along the inner aspect of the right foot and to a lesser extent along the top of the right foot  Patient has been in pain for the past 4-5 months  He notes that if he takes Advil he has relief of symptoms  Patient relates modest pain in the left foot but not as severe is the right  The following portions of the patient's history were reviewed and updated as appropriate: allergies, current medications, past family history, past medical history, past social history, past surgical history and problem list     Review of Systems   Respiratory: Positive for shortness of breath  Cardiovascular:        Thoracic  aortic aneurysm without rupture   Gastrointestinal: Negative  Neurological: Negative  Objective:      /88   Pulse 68   Ht 6' 1" (1 854 m)   Wt 84 7 kg (186 lb 12 8 oz)   BMI 24 65 kg/m²          Physical Exam  Constitutional:       Appearance: Normal appearance  Cardiovascular:      Pulses: Normal pulses  Musculoskeletal:         General: Tenderness present  Comments: Mild discomfort should elicited with palpation of the posterior tibial tendon right should at its insertion into the calcaneus  Hypertrophied navicular bones bilateral   Mild discomfort along the EHL tendon to right hallux  Skin:     General: Skin is warm     Neurological: General: No focal deficit present  Mental Status: He is oriented to person, place, and time

## 2021-10-21 ENCOUNTER — TELEPHONE (OUTPATIENT)
Dept: INTERNAL MEDICINE CLINIC | Age: 66
End: 2021-10-21

## 2021-10-29 ENCOUNTER — HOSPITAL ENCOUNTER (OUTPATIENT)
Dept: RADIOLOGY | Age: 66
Discharge: HOME | End: 2021-10-29
Payer: COMMERCIAL

## 2021-10-29 DIAGNOSIS — I71.4 ABDOMINAL AORTIC ANEURYSM (AAA) WITHOUT RUPTURE (HCC): ICD-10-CM

## 2021-10-29 PROCEDURE — G1004 CDSM NDSC: HCPCS

## 2021-10-29 PROCEDURE — 71260 CT THORAX DX C+: CPT

## 2021-10-29 RX ADMIN — IOHEXOL 85 ML: 350 INJECTION, SOLUTION INTRAVENOUS at 10:37

## 2021-11-02 ENCOUNTER — RA CDI HCC (OUTPATIENT)
Dept: OTHER | Facility: HOSPITAL | Age: 66
End: 2021-11-02

## 2021-11-08 ENCOUNTER — OFFICE VISIT (OUTPATIENT)
Dept: INTERNAL MEDICINE CLINIC | Age: 66
End: 2021-11-08
Payer: COMMERCIAL

## 2021-11-08 VITALS
TEMPERATURE: 98.2 F | HEIGHT: 74 IN | OXYGEN SATURATION: 99 % | BODY MASS INDEX: 24.05 KG/M2 | HEART RATE: 70 BPM | WEIGHT: 187.4 LBS | SYSTOLIC BLOOD PRESSURE: 136 MMHG | DIASTOLIC BLOOD PRESSURE: 82 MMHG

## 2021-11-08 DIAGNOSIS — Z13.220 SCREENING CHOLESTEROL LEVEL: ICD-10-CM

## 2021-11-08 DIAGNOSIS — I71.2 THORACIC ASCENDING AORTIC ANEURYSM (HCC): ICD-10-CM

## 2021-11-08 DIAGNOSIS — Z12.11 COLON CANCER SCREENING: Primary | ICD-10-CM

## 2021-11-08 DIAGNOSIS — Z12.5 SCREENING PSA (PROSTATE SPECIFIC ANTIGEN): ICD-10-CM

## 2021-11-08 DIAGNOSIS — J43.2 CENTRILOBULAR EMPHYSEMA (HCC): ICD-10-CM

## 2021-11-08 DIAGNOSIS — F41.9 ANXIETY: ICD-10-CM

## 2021-11-08 DIAGNOSIS — R09.81 NASAL CONGESTION: ICD-10-CM

## 2021-11-08 PROCEDURE — 3008F BODY MASS INDEX DOCD: CPT | Performed by: FAMILY MEDICINE

## 2021-11-08 PROCEDURE — 1160F RVW MEDS BY RX/DR IN RCRD: CPT | Performed by: FAMILY MEDICINE

## 2021-11-08 PROCEDURE — 99214 OFFICE O/P EST MOD 30 MIN: CPT | Performed by: FAMILY MEDICINE

## 2021-11-08 PROCEDURE — 1036F TOBACCO NON-USER: CPT | Performed by: FAMILY MEDICINE

## 2021-11-08 PROCEDURE — 3725F SCREEN DEPRESSION PERFORMED: CPT | Performed by: FAMILY MEDICINE

## 2021-11-08 RX ORDER — AMOXICILLIN 875 MG/1
875 TABLET, COATED ORAL 2 TIMES DAILY
Qty: 14 TABLET | Refills: 0 | Status: SHIPPED | OUTPATIENT
Start: 2021-11-08 | End: 2021-11-15

## 2021-11-08 RX ORDER — ESCITALOPRAM OXALATE 10 MG/1
10 TABLET ORAL DAILY
Qty: 90 TABLET | Refills: 1 | Status: SHIPPED | OUTPATIENT
Start: 2021-11-08

## 2022-02-11 LAB — COLOGUARD RESULT REPORTABLE: NEGATIVE

## 2022-02-16 ENCOUNTER — OFFICE VISIT (OUTPATIENT)
Dept: PODIATRY | Facility: CLINIC | Age: 67
End: 2022-02-16
Payer: COMMERCIAL

## 2022-02-16 VITALS
WEIGHT: 192 LBS | DIASTOLIC BLOOD PRESSURE: 82 MMHG | SYSTOLIC BLOOD PRESSURE: 140 MMHG | HEIGHT: 73 IN | BODY MASS INDEX: 25.45 KG/M2

## 2022-02-16 DIAGNOSIS — M79.671 RIGHT FOOT PAIN: ICD-10-CM

## 2022-02-16 DIAGNOSIS — M77.9 TENDONITIS: Primary | ICD-10-CM

## 2022-02-16 PROCEDURE — 1036F TOBACCO NON-USER: CPT | Performed by: PODIATRIST

## 2022-02-16 PROCEDURE — 20550 NJX 1 TENDON SHEATH/LIGAMENT: CPT | Performed by: PODIATRIST

## 2022-02-16 PROCEDURE — 1160F RVW MEDS BY RX/DR IN RCRD: CPT | Performed by: PODIATRIST

## 2022-02-16 PROCEDURE — 3008F BODY MASS INDEX DOCD: CPT | Performed by: PODIATRIST

## 2022-02-16 PROCEDURE — 99212 OFFICE O/P EST SF 10 MIN: CPT | Performed by: PODIATRIST

## 2022-02-16 RX ORDER — LIDOCAINE HYDROCHLORIDE 10 MG/ML
1 INJECTION, SOLUTION EPIDURAL; INFILTRATION; INTRACAUDAL; PERINEURAL ONCE
Status: COMPLETED | OUTPATIENT
Start: 2022-02-16 | End: 2022-02-17

## 2022-02-16 RX ORDER — TRIAMCINOLONE ACETONIDE 40 MG/ML
20 INJECTION, SUSPENSION INTRA-ARTICULAR; INTRAMUSCULAR ONCE
Status: COMPLETED | OUTPATIENT
Start: 2022-02-16 | End: 2022-02-17

## 2022-02-16 NOTE — PROGRESS NOTES
Patient presents for assessment of right foot  Patient states that for the most part the meloxicam that was prescribed months back was very helpful  However, he has developed discomfort on the bottom of the right foot proximal to the tibial sesamoid in the area of the long flexor tendon to the hallux  This area throbs when walking  On exam, pain with palpation right foot at [facility] tendon on the plantar aspect of the foot proximal to the tibial sesamoid  Treatment:  Injected right foot with 0 5 cc Kenalog 40 along with 1 cc 1% xylocaine  Patient will be reassessed in 4 weeks  Foot injection     Date/Time 2/16/2022 11:22 AM     Performed by  Kirsten Quarles DPM     Authorized by Kirsten Quarles DPM      Universal Protocol   Consent: Verbal consent obtained  Risks and benefits: risks, benefits and alternatives were discussed  Consent given by: patient  Patient understanding: patient states understanding of the procedure being performed  Patient identity confirmed: verbally with patient        Local anesthesia used: yes     Anesthesia   Local anesthesia used: yes  Local Anesthetic: lidocaine 1% without epinephrine     Procedure Details   Procedure Notes: Injected right foot with 0 5 cc Kenalog 40 along with 1 cc 1% xylocaine

## 2022-02-17 RX ADMIN — LIDOCAINE HYDROCHLORIDE 1 ML: 10 INJECTION, SOLUTION EPIDURAL; INFILTRATION; INTRACAUDAL; PERINEURAL at 07:54

## 2022-02-17 RX ADMIN — TRIAMCINOLONE ACETONIDE 20 MG: 40 INJECTION, SUSPENSION INTRA-ARTICULAR; INTRAMUSCULAR at 07:55

## 2022-02-21 ENCOUNTER — TELEPHONE (OUTPATIENT)
Dept: INTERNAL MEDICINE CLINIC | Age: 67
End: 2022-02-21

## 2022-03-29 ENCOUNTER — OFFICE VISIT (OUTPATIENT)
Dept: PODIATRY | Facility: CLINIC | Age: 67
End: 2022-03-29
Payer: COMMERCIAL

## 2022-03-29 VITALS
WEIGHT: 192 LBS | SYSTOLIC BLOOD PRESSURE: 135 MMHG | HEART RATE: 75 BPM | DIASTOLIC BLOOD PRESSURE: 87 MMHG | BODY MASS INDEX: 25.45 KG/M2 | HEIGHT: 73 IN

## 2022-03-29 DIAGNOSIS — M77.9 TENDONITIS: Primary | ICD-10-CM

## 2022-03-29 DIAGNOSIS — M79.671 RIGHT FOOT PAIN: ICD-10-CM

## 2022-03-29 PROCEDURE — 1036F TOBACCO NON-USER: CPT | Performed by: PODIATRIST

## 2022-03-29 PROCEDURE — 1160F RVW MEDS BY RX/DR IN RCRD: CPT | Performed by: PODIATRIST

## 2022-03-29 PROCEDURE — 3008F BODY MASS INDEX DOCD: CPT | Performed by: PODIATRIST

## 2022-03-29 PROCEDURE — 99212 OFFICE O/P EST SF 10 MIN: CPT | Performed by: PODIATRIST

## 2022-03-29 NOTE — PROGRESS NOTES
Patient presents for assessment of right foot  Minimal if any pain related  No additional treatment is needed  Dianne reinoso

## 2022-05-23 ENCOUNTER — RA CDI HCC (OUTPATIENT)
Dept: OTHER | Facility: HOSPITAL | Age: 67
End: 2022-05-23

## 2022-05-23 NOTE — PROGRESS NOTES
[address]  coding opportunities       Chart reviewed, no opportunity found: CHART REVIEWED, NO OPPORTUNITY FOUND        Patients Insurance     Medicare Insurance: Medicare

## 2022-06-06 ENCOUNTER — APPOINTMENT (OUTPATIENT)
Dept: LAB | Facility: IMAGING CENTER | Age: 67
End: 2022-06-06
Payer: COMMERCIAL

## 2022-06-06 DIAGNOSIS — Z13.220 SCREENING CHOLESTEROL LEVEL: ICD-10-CM

## 2022-06-06 DIAGNOSIS — F41.9 ANXIETY: ICD-10-CM

## 2022-06-06 DIAGNOSIS — Z12.5 SCREENING PSA (PROSTATE SPECIFIC ANTIGEN): ICD-10-CM

## 2022-06-06 LAB
ALBUMIN SERPL BCP-MCNC: 3.6 G/DL (ref 3.5–5)
ALP SERPL-CCNC: 54 U/L (ref 46–116)
ALT SERPL W P-5'-P-CCNC: 28 U/L (ref 12–78)
ANION GAP SERPL CALCULATED.3IONS-SCNC: 2 MMOL/L (ref 4–13)
AST SERPL W P-5'-P-CCNC: 17 U/L (ref 5–45)
BASOPHILS # BLD AUTO: 0.04 THOUSANDS/ΜL (ref 0–0.1)
BASOPHILS NFR BLD AUTO: 1 % (ref 0–1)
BILIRUB SERPL-MCNC: 0.68 MG/DL (ref 0.2–1)
BUN SERPL-MCNC: 20 MG/DL (ref 5–25)
CALCIUM SERPL-MCNC: 9.2 MG/DL (ref 8.3–10.1)
CHLORIDE SERPL-SCNC: 108 MMOL/L (ref 100–108)
CHOLEST SERPL-MCNC: 183 MG/DL
CO2 SERPL-SCNC: 29 MMOL/L (ref 21–32)
CREAT SERPL-MCNC: 0.82 MG/DL (ref 0.6–1.3)
EOSINOPHIL # BLD AUTO: 0.07 THOUSAND/ΜL (ref 0–0.61)
EOSINOPHIL NFR BLD AUTO: 1 % (ref 0–6)
ERYTHROCYTE [DISTWIDTH] IN BLOOD BY AUTOMATED COUNT: 12.5 % (ref 11.6–15.1)
GFR SERPL CREATININE-BSD FRML MDRD: 90 ML/MIN/1.73SQ M
GLUCOSE P FAST SERPL-MCNC: 111 MG/DL (ref 65–99)
HCT VFR BLD AUTO: 44.5 % (ref 36.5–49.3)
HDLC SERPL-MCNC: 54 MG/DL
HGB BLD-MCNC: 14.8 G/DL (ref 12–17)
IMM GRANULOCYTES # BLD AUTO: 0.01 THOUSAND/UL (ref 0–0.2)
IMM GRANULOCYTES NFR BLD AUTO: 0 % (ref 0–2)
LDLC SERPL CALC-MCNC: 115 MG/DL (ref 0–100)
LYMPHOCYTES # BLD AUTO: 2.28 THOUSANDS/ΜL (ref 0.6–4.47)
LYMPHOCYTES NFR BLD AUTO: 42 % (ref 14–44)
MCH RBC QN AUTO: 32.2 PG (ref 26.8–34.3)
MCHC RBC AUTO-ENTMCNC: 33.3 G/DL (ref 31.4–37.4)
MCV RBC AUTO: 97 FL (ref 82–98)
MONOCYTES # BLD AUTO: 0.5 THOUSAND/ΜL (ref 0.17–1.22)
MONOCYTES NFR BLD AUTO: 9 % (ref 4–12)
NEUTROPHILS # BLD AUTO: 2.58 THOUSANDS/ΜL (ref 1.85–7.62)
NEUTS SEG NFR BLD AUTO: 47 % (ref 43–75)
NONHDLC SERPL-MCNC: 129 MG/DL
NRBC BLD AUTO-RTO: 0 /100 WBCS
PLATELET # BLD AUTO: 249 THOUSANDS/UL (ref 149–390)
PMV BLD AUTO: 8.3 FL (ref 8.9–12.7)
POTASSIUM SERPL-SCNC: 4.4 MMOL/L (ref 3.5–5.3)
PROT SERPL-MCNC: 7.2 G/DL (ref 6.4–8.2)
PSA SERPL-MCNC: 1.2 NG/ML (ref 0–4)
RBC # BLD AUTO: 4.6 MILLION/UL (ref 3.88–5.62)
SODIUM SERPL-SCNC: 139 MMOL/L (ref 136–145)
TRIGL SERPL-MCNC: 71 MG/DL
WBC # BLD AUTO: 5.48 THOUSAND/UL (ref 4.31–10.16)

## 2022-06-06 PROCEDURE — 80061 LIPID PANEL: CPT

## 2022-06-06 PROCEDURE — 36415 COLL VENOUS BLD VENIPUNCTURE: CPT

## 2022-06-06 PROCEDURE — 80053 COMPREHEN METABOLIC PANEL: CPT

## 2022-06-06 PROCEDURE — G0103 PSA SCREENING: HCPCS

## 2022-06-06 PROCEDURE — 85025 COMPLETE CBC W/AUTO DIFF WBC: CPT

## 2022-06-13 ENCOUNTER — OFFICE VISIT (OUTPATIENT)
Dept: INTERNAL MEDICINE CLINIC | Age: 67
End: 2022-06-13
Payer: COMMERCIAL

## 2022-06-13 VITALS
DIASTOLIC BLOOD PRESSURE: 80 MMHG | BODY MASS INDEX: 24.45 KG/M2 | HEART RATE: 70 BPM | OXYGEN SATURATION: 98 % | TEMPERATURE: 98.3 F | WEIGHT: 184.5 LBS | HEIGHT: 73 IN | SYSTOLIC BLOOD PRESSURE: 138 MMHG

## 2022-06-13 DIAGNOSIS — I71.4 ABDOMINAL AORTIC ANEURYSM (AAA) WITHOUT RUPTURE (HCC): Primary | ICD-10-CM

## 2022-06-13 DIAGNOSIS — F41.9 ANXIETY: ICD-10-CM

## 2022-06-13 DIAGNOSIS — Z00.00 MEDICARE ANNUAL WELLNESS VISIT, SUBSEQUENT: ICD-10-CM

## 2022-06-13 DIAGNOSIS — I71.2 THORACIC AORTIC ANEURYSM WITHOUT RUPTURE (HCC): ICD-10-CM

## 2022-06-13 DIAGNOSIS — S83.222A PERIPHERAL TEAR OF MEDIAL MENISCUS OF LEFT KNEE AS CURRENT INJURY, INITIAL ENCOUNTER: ICD-10-CM

## 2022-06-13 DIAGNOSIS — Z83.3 FAMILY HISTORY OF DIABETES MELLITUS: ICD-10-CM

## 2022-06-13 DIAGNOSIS — J43.2 CENTRILOBULAR EMPHYSEMA (HCC): ICD-10-CM

## 2022-06-13 DIAGNOSIS — R73.09 ABNORMAL GLUCOSE: ICD-10-CM

## 2022-06-13 PROCEDURE — 1170F FXNL STATUS ASSESSED: CPT | Performed by: FAMILY MEDICINE

## 2022-06-13 PROCEDURE — G0439 PPPS, SUBSEQ VISIT: HCPCS | Performed by: FAMILY MEDICINE

## 2022-06-13 PROCEDURE — 1160F RVW MEDS BY RX/DR IN RCRD: CPT | Performed by: FAMILY MEDICINE

## 2022-06-13 PROCEDURE — 99214 OFFICE O/P EST MOD 30 MIN: CPT | Performed by: FAMILY MEDICINE

## 2022-06-13 PROCEDURE — 3288F FALL RISK ASSESSMENT DOCD: CPT | Performed by: FAMILY MEDICINE

## 2022-06-13 PROCEDURE — 3008F BODY MASS INDEX DOCD: CPT | Performed by: FAMILY MEDICINE

## 2022-06-13 PROCEDURE — 1036F TOBACCO NON-USER: CPT | Performed by: FAMILY MEDICINE

## 2022-06-13 PROCEDURE — 1125F AMNT PAIN NOTED PAIN PRSNT: CPT | Performed by: FAMILY MEDICINE

## 2022-06-13 NOTE — PATIENT INSTRUCTIONS
Medicare Preventive Visit Patient Instructions  Thank you for completing your Welcome to Medicare Visit or Medicare Annual Wellness Visit today  Your next wellness visit will be due in one year (6/14/2023)  The screening/preventive services that you may require over the next 5-10 years are detailed below  Some tests may not apply to you based off risk factors and/or age  Screening tests ordered at today's visit but not completed yet may show as past due  Also, please note that scanned in results may not display below  Preventive Screenings:  Service Recommendations Previous Testing/Comments   Colorectal Cancer Screening  · Colonoscopy    · Fecal Occult Blood Test (FOBT)/Fecal Immunochemical Test (FIT)  · Fecal DNA/Cologuard Test  · Flexible Sigmoidoscopy Age: 54-65 years old   Colonoscopy: every 10 years (May be performed more frequently if at higher risk)  OR  FOBT/FIT: every 1 year  OR  Cologuard: every 3 years  OR  Sigmoidoscopy: every 5 years  Screening may be recommended earlier than age 48 if at higher risk for colorectal cancer  Also, an individualized decision between you and your healthcare provider will decide whether screening between the ages of 74-80 would be appropriate   Colonoscopy: 02/01/2022  FOBT/FIT: 10/05/2020  Cologuard: 02/01/2022  Sigmoidoscopy: Not on file    Screening Current     Prostate Cancer Screening Individualized decision between patient and health care provider in men between ages of 53-78   Medicare will cover every 12 months beginning on the day after your 50th birthday PSA: 1 2 ng/mL     Screening Current     Hepatitis C Screening Once for adults born between 1945 and 1965  More frequently in patients at high risk for Hepatitis C Hep C Antibody: 01/17/2019    Screening Current   Diabetes Screening 1-2 times per year if you're at risk for diabetes or have pre-diabetes Fasting glucose: 111 mg/dL   A1C: No results in last 5 years    Screening Current   Cholesterol Screening Once every 5 years if you don't have a lipid disorder  May order more often based on risk factors  Lipid panel: 06/06/2022    Screening Current      Other Preventive Screenings Covered by Medicare:  1  Abdominal Aortic Aneurysm (AAA) Screening: covered once if your at risk  You're considered to be at risk if you have a family history of AAA or a male between the age of 73-68 who smoking at least 100 cigarettes in your lifetime  2  Lung Cancer Screening: covers low dose CT scan once per year if you meet all of the following conditions: (1) Age 50-69; (2) No signs or symptoms of lung cancer; (3) Current smoker or have quit smoking within the last 15 years; (4) You have a tobacco smoking history of at least 30 pack years (packs per day x number of years you smoked); (5) You get a written order from a healthcare provider  3  Glaucoma Screening: covered annually if you're considered high risk: (1) You have diabetes OR (2) Family history of glaucoma OR (3)  aged 48 and older OR (3)  American aged 72 and older  3  Osteoporosis Screening: covered every 2 years if you meet one of the following conditions: (1) Have a vertebral abnormality; (2) On glucocorticoid therapy for more than 3 months; (3) Have primary hyperparathyroidism; (4) On osteoporosis medications and need to assess response to drug therapy  5  HIV Screening: covered annually if you're between the age of 12-76  Also covered annually if you are younger than 13 and older than 72 with risk factors for HIV infection  For pregnant patients, it is covered up to 3 times per pregnancy      Immunizations:  Immunization Recommendations   Influenza Vaccine Annual influenza vaccination during flu season is recommended for all persons aged >= 6 months who do not have contraindications   Pneumococcal Vaccine (Prevnar and Pneumovax)  * Prevnar = PCV13  * Pneumovax = PPSV23 Adults 25-60 years old: 1-3 doses may be recommended based on certain risk factors  Adults 72 years old: Prevnar (PCV13) vaccine recommended followed by Pneumovax (PPSV23) vaccine  If already received PPSV23 since turning 65, then PCV13 recommended at least one year after PPSV23 dose  Hepatitis B Vaccine 3 dose series if at intermediate or high risk (ex: diabetes, end stage renal disease, liver disease)   Tetanus (Td) Vaccine - COST NOT COVERED BY MEDICARE PART B Following completion of primary series, a booster dose should be given every 10 years to maintain immunity against tetanus  Td may also be given as tetanus wound prophylaxis  Tdap Vaccine - COST NOT COVERED BY MEDICARE PART B Recommended at least once for all adults  For pregnant patients, recommended with each pregnancy  Shingles Vaccine (Shingrix) - COST NOT COVERED BY MEDICARE PART B  2 shot series recommended in those aged 48 and above     Health Maintenance Due:      Topic Date Due    Colorectal Cancer Screening  02/02/2022    Hepatitis C Screening  Completed     Immunizations Due:      Topic Date Due    COVID-19 Vaccine (3 - Booster for Sewell Raudel series) 07/24/2021     Advance Directives   What are advance directives? Advance directives are legal documents that state your wishes and plans for medical care  These plans are made ahead of time in case you lose your ability to make decisions for yourself  Advance directives can apply to any medical decision, such as the treatments you want, and if you want to donate organs  What are the types of advance directives? There are many types of advance directives, and each state has rules about how to use them  You may choose a combination of any of the following:  · Living will: This is a written record of the treatment you want  You can also choose which treatments you do not want, which to limit, and which to stop at a certain time  This includes surgery, medicine, IV fluid, and tube feedings  · Durable power of  for healthcare [facility]):   This is a written record that states who you want to make healthcare choices for you when you are unable to make them for yourself  This person, called a proxy, is usually a family member or a friend  You may choose more than 1 proxy  · Do not resuscitate (DNR) order:  A DNR order is used in case your heart stops beating or you stop breathing  It is a request not to have certain forms of treatment, such as CPR  A DNR order may be included in other types of advance directives  · Medical directive: This covers the care that you want if you are in a coma, near death, or unable to make decisions for yourself  You can list the treatments you want for each condition  Treatment may include pain medicine, surgery, blood transfusions, dialysis, IV or tube feedings, and a ventilator (breathing machine)  · Values history: This document has questions about your views, beliefs, and how you feel and think about life  This information can help others choose the care that you would choose  Why are advance directives important? An advance directive helps you control your care  Although spoken wishes may be used, it is better to have your wishes written down  Spoken wishes can be misunderstood, or not followed  Treatments may be given even if you do not want them  An advance directive may make it easier for your family to make difficult choices about your care  © Copyright [employer] 2018 Information is for End User's use only and may not be sold, redistributed or otherwise used for commercial purposes   All illustrations and images included in CareNotes® are the copyrighted property of [employer]  or [address] [place] [address] [employer]

## 2022-06-13 NOTE — PROGRESS NOTES
Assessment and Plan:     Problem List Items Addressed This Visit        Respiratory    Centrilobular emphysema ([facility] [address] )       Cardiovascular and Mediastinum    Thoracic aortic aneurysm without rupture ([facility] [address] )    Abdominal aortic aneurysm (AAA) without rupture [facility]) - Primary    Relevant Orders    Ambulatory Referral to Vascular Surgery       Other    Anxiety    Medicare annual wellness visit, subsequent      Other Visit Diagnoses     Abnormal glucose        Relevant Orders    Comprehensive metabolic panel    Hemoglobin A1C        PNA UTD  cologuard 2/2021 negative  PSA 6/2022 normal  Eye utd  Dental UTD         Preventive health issues were discussed with patient, and age appropriate screening tests were ordered as noted in patient's After Visit Summary  Personalized health advice and appropriate referrals for health education or preventive services given if needed, as noted in patient's After Visit Summary       History of Present Illness:     Patient presents for Medicare Annual Wellness visit    Patient Care Team:  Anila Del Rosario DO as PCP - MD Donavan Posada MD     Problem List:     Patient Active Problem List   Diagnosis    Adrenal adenoma    Anxiety    Thoracic aortic aneurysm without rupture [facility])    Testicular cyst    Need for hepatitis C screening test    Screening for hyperlipidemia    Screening for prostate cancer    Fuchs' corneal dystrophy    Centrilobular emphysema ([facility] [address] )    Abdominal aortic aneurysm (AAA) without rupture ([facility] [address] )    Second hand smoke exposure    History of cataract    Thoracic ascending aortic aneurysm ([facility] [address] )    Medicare annual wellness visit, subsequent      Past Medical and Surgical History:     Past Medical History:   Diagnosis Date    Adrenal adenoma     Anxiety     Arthritis     Cardiac disorder     Cataracts, bilateral     COPD (chronic obstructive pulmonary disease) ([facility] [address] ) 08/2019    Hearing loss     HL (hearing loss)     Polyp of colon     Testicular cyst     Thoracic aortic aneurysm (HCC)     Visual impairment      Past Surgical History:   Procedure Laterality Date    APPENDECTOMY      CATARACT EXTRACTION, BILATERAL      EYE SURGERY  2019    HERNIA REPAIR      2    SHOULDER SURGERY Left       Family History:     Family History   Problem Relation Age of Onset    Hypertension Mother     Hypertension Father     Throat cancer Father     Cancer Father     Cancer Sister       Social History:     Social History     Socioeconomic History    Marital status: Unknown     Spouse name: None    Number of children: None    Years of education: None    Highest education level: None   Occupational History    None   Tobacco Use    Smoking status: Former Smoker     Packs/day: 0 00     Years: 0 00     Pack years: 0 00     Types: Cigarettes, Cigarettes     Quit date: 1973     Years since quittin 4    Smokeless tobacco: Never Used   Vaping Use    Vaping Use: Never used   Substance and Sexual Activity    Alcohol use:  Yes     Alcohol/week: 1 0 standard drink     Types: 1 Cans of beer per week    Drug use: Not Currently     Frequency: 1 0 times per week     Types: Marijuana     Comment:     Sexual activity: Yes     Partners: Female   Other Topics Concern    None   Social History Narrative    None     Social Determinants of Health     Financial Resource Strain: Not on file   Food Insecurity: Not on file   Transportation Needs: Not on file   Physical Activity: Not on file   Stress: Not on file   Social Connections: Not on file   Intimate Partner Violence: Not on file   Housing Stability: Not on file      Medications and Allergies:     Current Outpatient Medications   Medication Sig Dispense Refill    Cyanocobalamin (Vitamin B 12) 500 MCG TABS Take 500 mcg by mouth daily      escitalopram (LEXAPRO) 10 mg tablet Take 1 tablet (10 mg total) by mouth daily 90 tablet 1    fluticasone-salmeterol (Advair Diskus) 100-50 mcg/dose inhaler Inhale 1 puff 2 (two) times a day Rinse mouth after use  3 blister 3    Misc Natural Products (Osteo Bi-Flex Triple Strength) TABS Take by mouth daily      Multiple Vitamins-Minerals (MULTIVITAMIN ADULT PO) Take 1 tablet by mouth daily       No current facility-administered medications for this visit  No Known Allergies   Immunizations:     Immunization History   Administered Date(s) Administered    COVID-19 MODERNA VACC 0 5 ML IM 01/27/2021, 02/24/2021    INFLUENZA 11/04/2018, 11/08/2020, 11/08/2020, 11/07/2021, 11/07/2021    Pneumococcal Conjugate 13-Valent 01/28/2019    Pneumococcal Polysaccharide PPV23 05/24/2021    Tdap 02/10/2022    Zoster 01/14/2016      Health Maintenance:         Topic Date Due    Colorectal Cancer Screening  02/02/2022    Hepatitis C Screening  Completed         Topic Date Due    COVID-19 Vaccine (3 - Booster for Moderna series) 07/24/2021      Medicare Health Risk Assessment:     /80 (BP Location: Left arm, Patient Position: Sitting, Cuff Size: Standard)   Pulse 70   Temp 98 3 °F (36 8 °C) (Temporal)   Ht 6' 1" (1 854 m)   Wt 83 7 kg (184 lb 8 oz)   SpO2 98%   BMI 24 34 kg/m²          Health Risk Assessment:   Patient rates overall health as good  Patient feels that their physical health rating is slightly worse  Patient is satisfied with their life  Eyesight was rated as slightly worse  Hearing was rated as slightly worse  Patient feels that their emotional and mental health rating is same  Patients states they are sometimes angry  Patient states they are sometimes unusually tired/fatigued  Pain experienced in the last 7 days has been some  Patient's pain rating has been 7/10  Patient states that he has experienced no weight loss or gain in last 6 months  Fall Risk Screening: In the past year, patient has experienced: no history of falling in past year      Home Safety:  Patient does not have trouble with stairs inside or outside of their home   Patient has working smoke alarms and has working carbon monoxide detector  Home safety hazards include: none  Nutrition:   Current diet is Regular  Medications:   Patient is not currently taking any over-the-counter supplements  Patient is able to manage medications  Activities of Daily Living (ADLs)/Instrumental Activities of Daily Living (IADLs):   Walk and transfer into and out of bed and chair?: Yes  Dress and groom yourself?: Yes    Bathe or shower yourself?: Yes    Feed yourself? Yes  Do your laundry/housekeeping?: Yes  Manage your money, pay your bills and track your expenses?: Yes  Make your own meals?: Yes    Do your own shopping?: Yes    Previous Hospitalizations:   Any hospitalizations or ED visits within the last 12 months?: Yes    How many hospitalizations have you had in the last year?: 1-2    Advance Care Planning:   Living will: Yes    Durable POA for healthcare: Yes    Advanced directive: Yes      Comments: His wife    Cognitive Screening:   Provider or family/friend/caregiver concerned regarding cognition?: No    PREVENTIVE SCREENINGS      Cardiovascular Screening:    General: Screening Current      Diabetes Screening:     General: Screening Current      Colorectal Cancer Screening:     General: Screening Current      Prostate Cancer Screening:    General: Screening Current      Abdominal Aortic Aneurysm (AAA) Screening:    Risk factors include: age between 73-67 yo and tobacco use        General: Screening Not Indicated and History AAA      Lung Cancer Screening:     General: Screening Not Indicated      Hepatitis C Screening:    General: Screening Current    Screening, Brief Intervention, and Referral to Treatment (SBIRT)    Screening  Typical number of drinks in a day: 0  Typical number of drinks in a week: 1  Interpretation: Low risk drinking behavior      AUDIT-C Screenin) How often did you have a drink containing alcohol in the past year? 2 to 4 times a month  2) How many drinks did you have on a typical day when you were drinking in the past year? 1 to 2  3) How often did you have 6 or more drinks on one occasion in the past year? never    AUDIT-C Score: 2  Interpretation: Score 0-3 (male): Negative screen for alcohol misuse    Single Item Drug Screening:  How often have you used an illegal drug (including marijuana) or a prescription medication for non-medical reasons in the past year? never    Single Item Drug Screen Score: 0  Interpretation: Negative screen for possible drug use disorder    Brief Intervention  Alcohol & drug use screenings were reviewed  No concerns regarding substance use disorder identified  Other Counseling Topics:   Car/seat belt/driving safety, skin self-exam, sunscreen and regular weightbearing exercise and calcium and vitamin D intake         Dolores Hannah, DO

## 2022-06-13 NOTE — PROGRESS NOTES
Answers for HPI/ROS submitted by the patient on 6/11/2022  How would you rate your overall health?: good  Compared to last year, how is your physical health?: slightly worse  In general, how satisfied are you with your life?: satisfied  Compared to last year, how is your eyesight?: slightly worse  Compared to last year, how is your hearing?: slightly worse  Compared to last year, how is your emotional/mental health?: same  How often is anger a problem for you?: sometimes  How often do you feel unusually tired/fatigued?: sometimes  In the past 7 days, how much pain have you experienced?: some  If you answered "some" or "a lot", please rate the severity of your pain on a scale of 1 to 10 (1 being the least severe pain and 10 being the most intense pain)  : 7/10  In the past 6 months, have you lost or gained 10 pounds without trying?: No  One or more falls in the last year: No  Do you have trouble with the stairs inside or outside your home?: No  Does your home have working smoke alarms?: Yes  Does your home have a carbon monoxide monitor?: Yes  Which safety hazards (if any) have you experienced in your home? Please select all that apply : none  How would you describe your current diet?  Please select all that apply : Regular  In addition to prescription medications, are you taking any over-the-counter supplements?: No  Can you manage your medications?: Yes  Are you currently taking any opioid medications?: No  Can you walk and transfer into and out of your bed and chair?: Yes  Can you dress and groom yourself?: Yes  Can you bathe or shower yourself?: Yes  Can you feed yourself?: Yes  Can you do your laundry/ housekeeping?: Yes  Can you manage your money, pay your bills, and track your expenses?: Yes  Can you make your own meals?: Yes  Can you do your own shopping?: Yes  Within the last 12 months, have you had any hospitalizations or Emergency Department visits?: Yes  If yes, how many times have you been hospitalized within the past year?: 1-2  Do you have a living will?: Yes  Do you have a Durable POA (Power of ) for healthcare decisions?: Yes  Do you have an Advanced Directive for end of life decisions?: Yes  How often have you used an illegal drug (including marijuana) or a prescription medication for non-medical reasons in the past year?: never  What is the typical number of drinks you consume in a day?: 0  What is the typical number of drinks you consume in a week?: 1  How often did you have a drink containing alcohol in the past year?: 2 to 4 times a month  How many drinks did you have on a typical day  when you were drinking in the past year?: 1 to 2  How often did you have 6 or more drinks on one occasion in the past year?: never    Assessment/Plan:    1  Abdominal aortic aneurysm (AAA) without rupture [facility])  -     Ambulatory Referral to Vascular Surgery; Future    2  Medicare annual wellness visit, subsequent    3  Thoracic aortic aneurysm without rupture ([facility] [address] )    4  Centrilobular emphysema ([facility] )    5  Anxiety    6  Abnormal glucose  -     Comprehensive metabolic panel; Future  -     Hemoglobin A1C; Future    7  Family history of diabetes mellitus    8  Peripheral tear of medial meniscus of left knee as current injury, initial encounter            Patient Instructions       Medicare Preventive Visit Patient Instructions  Thank you for completing your Welcome to Medicare Visit or Medicare Annual Wellness Visit today  Your next wellness visit will be due in one year (6/14/2023)  The screening/preventive services that you may require over the next 5-10 years are detailed below  Some tests may not apply to you based off risk factors and/or age  Screening tests ordered at today's visit but not completed yet may show as past due  Also, please note that scanned in results may not display below    Preventive Screenings:  Service Recommendations Previous Testing/Comments   Colorectal Cancer Screening  · Colonoscopy · Fecal Occult Blood Test (FOBT)/Fecal Immunochemical Test (FIT)  · Fecal DNA/Cologuard Test  · Flexible Sigmoidoscopy Age: 54-65 years old   Colonoscopy: every 10 years (May be performed more frequently if at higher risk)  OR  FOBT/FIT: every 1 year  OR  Cologuard: every 3 years  OR  Sigmoidoscopy: every 5 years  Screening may be recommended earlier than age 48 if at higher risk for colorectal cancer  Also, an individualized decision between you and your healthcare provider will decide whether screening between the ages of 74-80 would be appropriate  Colonoscopy: 02/01/2022  FOBT/FIT: 10/05/2020  Cologuard: 02/01/2022  Sigmoidoscopy: Not on file    Screening Current     Prostate Cancer Screening Individualized decision between patient and health care provider in men between ages of 53-78   Medicare will cover every 12 months beginning on the day after your 50th birthday PSA: 1 2 ng/mL     Screening Current     Hepatitis C Screening Once for adults born between 1945 and 1965  More frequently in patients at high risk for Hepatitis C Hep C Antibody: 01/17/2019    Screening Current   Diabetes Screening 1-2 times per year if you're at risk for diabetes or have pre-diabetes Fasting glucose: 111 mg/dL   A1C: No results in last 5 years    Screening Current   Cholesterol Screening Once every 5 years if you don't have a lipid disorder  May order more often based on risk factors  Lipid panel: 06/06/2022    Screening Current      Other Preventive Screenings Covered by Medicare:  1  Abdominal Aortic Aneurysm (AAA) Screening: covered once if your at risk  You're considered to be at risk if you have a family history of AAA or a male between the age of 73-68 who smoking at least 100 cigarettes in your lifetime    2  Lung Cancer Screening: covers low dose CT scan once per year if you meet all of the following conditions: (1) Age 50-69; (2) No signs or symptoms of lung cancer; (3) Current smoker or have quit smoking within the last 15 years; (4) You have a tobacco smoking history of at least 30 pack years (packs per day x number of years you smoked); (5) You get a written order from a healthcare provider  3  Glaucoma Screening: covered annually if you're considered high risk: (1) You have diabetes OR (2) Family history of glaucoma OR (3)  aged 48 and older OR (3)  American aged 72 and older  3  Osteoporosis Screening: covered every 2 years if you meet one of the following conditions: (1) Have a vertebral abnormality; (2) On glucocorticoid therapy for more than 3 months; (3) Have primary hyperparathyroidism; (4) On osteoporosis medications and need to assess response to drug therapy  5  HIV Screening: covered annually if you're between the age of 12-76  Also covered annually if you are younger than 13 and older than 72 with risk factors for HIV infection  For pregnant patients, it is covered up to 3 times per pregnancy  Immunizations:  Immunization Recommendations   Influenza Vaccine Annual influenza vaccination during flu season is recommended for all persons aged >= 6 months who do not have contraindications   Pneumococcal Vaccine (Prevnar and Pneumovax)  * Prevnar = PCV13  * Pneumovax = PPSV23 Adults 25-60 years old: 1-3 doses may be recommended based on certain risk factors  Adults 72 years old: Prevnar (PCV13) vaccine recommended followed by Pneumovax (PPSV23) vaccine  If already received PPSV23 since turning 65, then PCV13 recommended at least one year after PPSV23 dose  Hepatitis B Vaccine 3 dose series if at intermediate or high risk (ex: diabetes, end stage renal disease, liver disease)   Tetanus (Td) Vaccine - COST NOT COVERED BY MEDICARE PART B Following completion of primary series, a booster dose should be given every 10 years to maintain immunity against tetanus  Td may also be given as tetanus wound prophylaxis     Tdap Vaccine - COST NOT COVERED BY MEDICARE PART B Recommended at least once for all adults  For pregnant patients, recommended with each pregnancy  Shingles Vaccine (Shingrix) - COST NOT COVERED BY MEDICARE PART B  2 shot series recommended in those aged 48 and above     Health Maintenance Due:      Topic Date Due    Colorectal Cancer Screening  02/02/2022    Hepatitis C Screening  Completed     Immunizations Due:      Topic Date Due    COVID-19 Vaccine (3 - Booster for [employer] series) 07/24/2021     Advance Directives   What are advance directives? Advance directives are legal documents that state your wishes and plans for medical care  These plans are made ahead of time in case you lose your ability to make decisions for yourself  Advance directives can apply to any medical decision, such as the treatments you want, and if you want to donate organs  What are the types of advance directives? There are many types of advance directives, and each state has rules about how to use them  You may choose a combination of any of the following:  · Living will: This is a written record of the treatment you want  You can also choose which treatments you do not want, which to limit, and which to stop at a certain time  This includes surgery, medicine, IV fluid, and tube feedings  · Durable power of  for healthcare [facility]): This is a written record that states who you want to make healthcare choices for you when you are unable to make them for yourself  This person, called a proxy, is usually a family member or a friend  You may choose more than 1 proxy  · Do not resuscitate (DNR) order:  A DNR order is used in case your heart stops beating or you stop breathing  It is a request not to have certain forms of treatment, such as CPR  A DNR order may be included in other types of advance directives  · Medical directive: This covers the care that you want if you are in a coma, near death, or unable to make decisions for yourself  You can list the treatments you want for each condition  Treatment may include pain medicine, surgery, blood transfusions, dialysis, IV or tube feedings, and a ventilator (breathing machine)  · Values history: This document has questions about your views, beliefs, and how you feel and think about life  This information can help others choose the care that you would choose  Why are advance directives important? An advance directive helps you control your care  Although spoken wishes may be used, it is better to have your wishes written down  Spoken wishes can be misunderstood, or not followed  Treatments may be given even if you do not want them  An advance directive may make it easier for your family to make difficult choices about your care  © Copyright [employer] 2018 Information is for End User's use only and may not be sold, redistributed or otherwise used for commercial purposes  All illustrations and images included in CareNotes® are the copyrighted property of A D A M , Inc  or [address]      Return in about 6 months (around 12/13/2022) for Recheck  Subjective:      Patient ID: Lady Garber is a 74 y o  male  Chief Complaint   Patient presents with    Follow-up     6 month fu - albs 6/6/22    Medicare Wellness Visit     AWV-SUB       HPI       Thoracic ascending aorta aneurysm   Stable at 4 cm on CT scan done in 2019  Avery Jama is due for follow-up   No symptoms  Nov 2021: stable , had repeat Ct scan  June 2022, has never been evaluated by vascular surgery     COPD   Smoking history for 1 year but significant secondhand smoke exposure   Takes his Advair but only daily now and feels well   No breakthrough symptoms of shortness of breath or wheezing or coughing   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 without any issues    Nov 2021: stable, no wheezing and SOB, no MATSON  June 2022, still wheezing, no progression in symptoms       Anxiety, taking Lexapro half tablet at 5 mg daily without any breakthrough symptoms   No HI or SI and sleeping well   Does not feel that he needs any changes  Nov 2021: no HI or SI, doing well, no side effects   June 2022, doing well  No side effects with Lexapro  No HI or SI  Left knee pain, has a medial meniscus tear which is known to him  Still able to do his regular activities and he is very active in terms of walking and exercising  He does not want to move forward with any surgeries at this time  The following portions of the patient's history were reviewed and updated as appropriate: allergies, current medications, past family history, past medical history, past social history, past surgical history and problem list     Review of Systems      Constitutional:  Denies fever or chills   Eyes:  Denies double , blurry vision or eye pain  HENT:  Denies nasal congestion or sore throat   Respiratory:  Denies cough or shortness of breath or wheezing  Cardiovascular:  Denies palpitations or chest pain  GI:  Denies abdominal pain, nausea, or vomiting, no loose stools, no reflux  Integument:  Denies rash , no open areas  Neurologic:  Denies headache or focal weakness, no dizziness  : no dysuria, or hematuria, + decrease urine flow         Current Outpatient Medications   Medication Sig Dispense Refill    Cyanocobalamin (Vitamin B 12) 500 MCG TABS Take 500 mcg by mouth daily      escitalopram (LEXAPRO) 10 mg tablet Take 1 tablet (10 mg total) by mouth daily 90 tablet 1    fluticasone-salmeterol (Advair Diskus) 100-50 mcg/dose inhaler Inhale 1 puff 2 (two) times a day Rinse mouth after use  3 blister 3    Misc Natural Products (Osteo Bi-Flex Triple Strength) TABS Take by mouth daily      Multiple Vitamins-Minerals (MULTIVITAMIN ADULT PO) Take 1 tablet by mouth daily       No current facility-administered medications for this visit         Objective:    /80 (BP Location: Left arm, Patient Position: Sitting, Cuff Size: Standard)   Pulse 70   Temp 98 3 °F (36 8 °C) (Temporal)   Ht 6' 1" (1 854 m)   Wt 83 7 kg (184 lb 8 oz)   SpO2 98%   BMI 24 34 kg/m²        Physical Exam       Constitutional:  Well developed, well nourished, no acute distress, non-toxic appearance   Eyes:  PERRL, conjunctiva normal , non icteric sclera  HENT:  Atraumatic, oropharynx moist  Neck-  supple   Respiratory:  CTA b/l, normal breath sounds, no rales, no wheezing   Cardiovascular:  RRR, no murmurs, no LE edema b/l  GI:  Soft, nondistended, normal bowel sounds x 4, nontender, no organomegaly, no mass, no rebound, no guarding   Neurologic:  no focal deficits noted   Psychiatric:  Speech and behavior appropriate , AAO x 3        Darek Floyd, DO

## 2022-08-14 ENCOUNTER — NURSE TRIAGE (OUTPATIENT)
Dept: OTHER | Facility: OTHER | Age: 67
End: 2022-08-14

## 2022-08-14 DIAGNOSIS — U07.1 ACUTE COVID-19: Primary | ICD-10-CM

## 2022-08-14 NOTE — TELEPHONE ENCOUNTER
Patient called in states he has positive home covid test  Symptoms started yesterday  Sore throat, congestion and low grade fever  Hx of COPD  Uses inhaler daily  Denies SOB or difficulty breathing  TC out to provider to advise on symptoms  Paxlovid sent to [facility] pharmacy and patient to use inhaler once daily with Paxlovid  Called patient made aware and verbalized understanding

## 2022-08-14 NOTE — TELEPHONE ENCOUNTER
Regarding: Covid care advise  ----- Message from Richa Lane sent at 8/14/2022 12:00 PM EDT -----  "I feel like I have a cold, and I tested positive for Covid "

## 2022-10-11 PROBLEM — Z00.00 MEDICARE ANNUAL WELLNESS VISIT, SUBSEQUENT: Status: RESOLVED | Noted: 2022-06-13 | Resolved: 2022-10-11

## 2022-12-08 ENCOUNTER — OFFICE VISIT (OUTPATIENT)
Dept: PODIATRY | Facility: CLINIC | Age: 67
End: 2022-12-08

## 2022-12-08 VITALS
WEIGHT: 185 LBS | SYSTOLIC BLOOD PRESSURE: 150 MMHG | BODY MASS INDEX: 24.52 KG/M2 | OXYGEN SATURATION: 98 % | DIASTOLIC BLOOD PRESSURE: 95 MMHG | HEART RATE: 67 BPM | HEIGHT: 73 IN

## 2022-12-08 DIAGNOSIS — M19.071 DJD (DEGENERATIVE JOINT DISEASE), ANKLE AND FOOT, RIGHT: ICD-10-CM

## 2022-12-08 DIAGNOSIS — M77.8 CAPSULITIS OF RIGHT FOOT: ICD-10-CM

## 2022-12-08 DIAGNOSIS — M79.671 RIGHT FOOT PAIN: Primary | ICD-10-CM

## 2022-12-08 RX ORDER — MELOXICAM 7.5 MG/1
7.5 TABLET ORAL DAILY
Qty: 30 TABLET | Refills: 2 | Status: SHIPPED | OUTPATIENT
Start: 2022-12-08

## 2022-12-08 NOTE — PROGRESS NOTES
Assessment/Plan:    The patient's clinical examination today is consistent with arthritic pain/capsulitis of the right first metatarsal cuneiform joint  The patient wanted to proceed with injection therapy as he has had good success with it in the past   After prepping the skin with alcohol and a cortisone injection was administered to the plantar aspect of the first metatarsocuneiform joint without complication  The patient also noted good relief with meloxicam in the past and this was renewed  We did discuss the potential benefits of good supportive shoe gear along with over-the-counter arch supports such as power step [place] series insoles  Recommend follow-up on an as-needed basis  Diagnoses and all orders for this visit:    Right foot pain  -     meloxicam (Mobic) 7 5 mg tablet; Take 1 tablet (7 5 mg total) by mouth daily    Capsulitis of right foot    DJD (degenerative joint disease), ankle and foot, right          Subjective:      Patient ID: Macy Anglin is a 69 y o  male  The patient presents today with a chief complaint of right midfoot pain that has been present for several weeks  He cannot recall any injury or trauma to his right foot  He does note a prior history of right midfoot pain that was treated successfully with an injection by Dr Chuck Puente several months ago  In reviewing his chart notes, it was noted the patient suffered from posterior tibial tendinitis at his last podiatry visit        The following portions of the patient's history were reviewed and updated as appropriate: allergies, current medications, past family history, past medical history, past social history, past surgical history and problem list       PAST MEDICAL HISTORY:  Past Medical History:   Diagnosis Date   • Adrenal adenoma    • Anxiety    • Arthritis    • Cardiac disorder    • Cataracts, bilateral    • COPD (chronic obstructive pulmonary disease) ([facility] ) 08/2019   • Hearing loss    • HL (hearing loss) • Polyp of colon    • Testicular cyst    • Thoracic aortic aneurysm    • Visual impairment        PAST SURGICAL HISTORY:  Past Surgical History:   Procedure Laterality Date   • APPENDECTOMY     • CATARACT EXTRACTION, BILATERAL     • EYE SURGERY  2019   • HERNIA REPAIR      2   • SHOULDER SURGERY Left         ALLERGIES:  Patient has no known allergies  MEDICATIONS:  Current Outpatient Medications   Medication Sig Dispense Refill   • Cyanocobalamin (Vitamin B 12) 500 MCG TABS Take 500 mcg by mouth daily     • escitalopram (LEXAPRO) 10 mg tablet Take 1 tablet (10 mg total) by mouth daily 90 tablet 1   • fluticasone-salmeterol (Advair Diskus) 100-50 mcg/dose inhaler Inhale 1 puff 2 (two) times a day Rinse mouth after use  3 blister 3   • meloxicam (Mobic) 7 5 mg tablet Take 1 tablet (7 5 mg total) by mouth daily 30 tablet 2   • Misc Natural Products (Osteo Bi-Flex Triple Strength) TABS Take by mouth daily     • Multiple Vitamins-Minerals (MULTIVITAMIN ADULT PO) Take 1 tablet by mouth daily       No current facility-administered medications for this visit  SOCIAL HISTORY:  Social History     Socioeconomic History   • Marital status: Unknown     Spouse name: None   • Number of children: None   • Years of education: None   • Highest education level: None   Occupational History   • None   Tobacco Use   • Smoking status: Former     Packs/day: 0 00     Years: 0 00     Pack years: 0 00     Types: Cigarettes     Quit date: 1973     Years since quittin 9   • Smokeless tobacco: Never   Vaping Use   • Vaping Use: Never used   Substance and Sexual Activity   • Alcohol use:  Yes     Alcohol/week: 1 0 standard drink     Types: 1 Cans of beer per week   • Drug use: Not Currently     Frequency: 1 0 times per week     Types: Marijuana     Comment:    • Sexual activity: Yes     Partners: Female   Other Topics Concern   • None   Social History Narrative   • None     Social Determinants of Health     Financial Resource Strain: Not on file   Food Insecurity: Not on file   Transportation Needs: Not on file   Physical Activity: Not on file   Stress: Not on file   Social Connections: Not on file   Intimate Partner Violence: Not on file   Housing Stability: Not on file        Review of Systems   Constitutional: Negative for chills and fever  HENT: Negative for ear pain and sore throat  Eyes: Negative for pain and visual disturbance  Respiratory: Negative for cough and shortness of breath  Cardiovascular: Negative for chest pain and palpitations  Gastrointestinal: Negative for abdominal pain and vomiting  Genitourinary: Negative for dysuria and hematuria  Musculoskeletal: Negative for arthralgias and back pain  Skin: Negative for color change and rash  Neurological: Negative for seizures and syncope  Psychiatric/Behavioral: Negative  All other systems reviewed and are negative  Objective:      /95 (BP Location: Right arm, Patient Position: Sitting, Cuff Size: Standard)   Pulse 67   Ht 6' 1" (1 854 m)   Wt 83 9 kg (185 lb)   SpO2 98%   BMI 24 41 kg/m²          Physical Exam  Vitals reviewed  Constitutional:       Appearance: Normal appearance  HENT:      Head: Normocephalic and atraumatic  Nose: Nose normal    Eyes:      Conjunctiva/sclera: Conjunctivae normal       Pupils: Pupils are equal, round, and reactive to light  Cardiovascular:      Pulses:           Dorsalis pedis pulses are 2+ on the right side  Posterior tibial pulses are 2+ on the right side  Pulmonary:      Effort: Pulmonary effort is normal    Feet:      Comments: There is tenderness to palpation to the area of the right first metatarsal cuneiform joint; there is no erythema nor ecchymosis nor edema or calor to the right midfoot; there is no tenderness to palpation of the posterior tibial tendon nor his insertion site at the navicular tuberosity  Skin:     General: Skin is warm        Capillary Refill: Capillary refill takes less than 2 seconds  Neurological:      General: No focal deficit present  Mental Status: He is alert and oriented to person, place, and time  Psychiatric:         Mood and Affect: Mood normal          Behavior: Behavior normal          Thought Content: Thought content normal          Small joint arthrocentesis: R intertarsal  Universal Protocol:  Consent: Verbal consent obtained  Risks and benefits: risks, benefits and alternatives were discussed  Consent given by: patient  Time out: Immediately prior to procedure a "time out" was called to verify the correct patient, procedure, equipment, support staff and site/side marked as required  Timeout called at: 12/8/2022 10:40 AM   Patient understanding: patient states understanding of the procedure being performed  Patient consent: the patient's understanding of the procedure matches consent given  Patient identity confirmed: verbally with patient and provided demographic data    Supporting Documentation  Indications: pain   Procedure Details  Location: foot - R intertarsal  Needle size: 25 G  Ultrasound guidance: no  Approach: medial  Medications administered: 1 mL bupivacaine (PF) 0 25 %; 20 mg triamcinolone acetonide 40 mg/mL    Aspirate amount: 0 mL      - After prepping the skin with alcohol, the point of maximum tenderness was palpated at the level of the first metatarsal cuneiform joint and an injection was administered consisting of 1 mL of 0 25% bupivacaine plain, and 0 5 mL of Kenalog 40  A dry sterile dressing was applied  The procedure was tolerated well

## 2022-12-09 RX ORDER — BUPIVACAINE HYDROCHLORIDE 2.5 MG/ML
1 INJECTION, SOLUTION EPIDURAL; INFILTRATION; INTRACAUDAL
Status: SHIPPED | OUTPATIENT
Start: 2022-12-09

## 2022-12-09 RX ORDER — TRIAMCINOLONE ACETONIDE 40 MG/ML
20 INJECTION, SUSPENSION INTRA-ARTICULAR; INTRAMUSCULAR
Status: SHIPPED | OUTPATIENT
Start: 2022-12-09

## 2022-12-09 RX ADMIN — TRIAMCINOLONE ACETONIDE 20 MG: 40 INJECTION, SUSPENSION INTRA-ARTICULAR; INTRAMUSCULAR at 14:54

## 2022-12-09 RX ADMIN — BUPIVACAINE HYDROCHLORIDE 1 ML: 2.5 INJECTION, SOLUTION EPIDURAL; INFILTRATION; INTRACAUDAL at 14:54

## 2023-02-23 NOTE — TELEPHONE ENCOUNTER
Reason for Disposition   HIGH RISK for severe COVID complications (e g , weak immune system, age > 59 years, obesity with BMI > 25, pregnant, chronic lung disease or other chronic medical condition)  (Exception: Already seen by PCP and no new or worsening symptoms )    Answer Assessment - Initial Assessment Questions  re you within 6 feet or less, for up to 15 minutes or more with a person that has a confirmed COVID-19 test? Denies       What was the date of your exposure? Positive - this morning       Are you experiencing any symptoms attributed to the virus?  (Assess for SOB, cough, fever, difficulty breathing)sore throat congestion, slight fever 99 5  denies other symptoms    Took alkalzer +    HIGH RISK: Do you have any history heart or lung conditions, weakened immune system, diabetes, Asthma, CHF, HIV, COPD, Chemo, renal failure, sickle cell, etc? Mild COPD - inhaler , denies     VACCINE: "Have you gotten the COVID-19 vaccine?" If Yes ask: "Which one, how many shots, when did you get it?" vaccinated and bootster x2 moderna    Protocols used: CORONAVIRUS (COVID-19) DIAGNOSED OR SUSPECTED-ADULT- Qbrexza Pregnancy And Lactation Text: There is no available data on Qbrexza use in pregnant women.  There is no available data on Qbrexza use in lactation.

## 2023-03-27 ENCOUNTER — TELEPHONE (OUTPATIENT)
Dept: CARDIAC SURGERY | Facility: CLINIC | Age: 68
End: 2023-03-27

## 2023-03-27 NOTE — TELEPHONE ENCOUNTER
We received a message referring this patient to our office again for aneurysm follow up  He was referred in 2019 and 2021  Our office was unable to reach him, see referral notes  His last Ct Chest was 10/29/21, so he will need a new Ct Chest w/o contrast ordered  Once the testing is completed, we can reach out to the patient again to schedule a consult

## 2023-03-28 DIAGNOSIS — I71.20 THORACIC AORTIC ANEURYSM WITHOUT RUPTURE, UNSPECIFIED PART (HCC): Primary | ICD-10-CM

## 2023-03-28 DIAGNOSIS — I71.40 ABDOMINAL AORTIC ANEURYSM (AAA) WITHOUT RUPTURE, UNSPECIFIED PART (HCC): ICD-10-CM

## 2023-03-30 ENCOUNTER — OFFICE VISIT (OUTPATIENT)
Dept: PODIATRY | Facility: CLINIC | Age: 68
End: 2023-03-30

## 2023-03-30 VITALS
HEART RATE: 69 BPM | WEIGHT: 186 LBS | SYSTOLIC BLOOD PRESSURE: 134 MMHG | OXYGEN SATURATION: 99 % | BODY MASS INDEX: 24.65 KG/M2 | HEIGHT: 73 IN | DIASTOLIC BLOOD PRESSURE: 94 MMHG

## 2023-03-30 DIAGNOSIS — M79.671 RIGHT FOOT PAIN: ICD-10-CM

## 2023-03-30 DIAGNOSIS — M77.8 CAPSULITIS OF RIGHT FOOT: Primary | ICD-10-CM

## 2023-03-30 RX ORDER — TRIAMCINOLONE ACETONIDE 40 MG/ML
40 INJECTION, SUSPENSION INTRA-ARTICULAR; INTRAMUSCULAR ONCE
OUTPATIENT
Start: 2023-03-30 | End: 2023-03-30

## 2023-03-30 RX ORDER — LIDOCAINE HYDROCHLORIDE 10 MG/ML
1 INJECTION, SOLUTION INFILTRATION; PERINEURAL ONCE
OUTPATIENT
Start: 2023-03-30 | End: 2023-03-30

## 2023-03-30 RX ORDER — METHYLPREDNISOLONE ACETATE 40 MG/ML
0.5 INJECTION, SUSPENSION INTRA-ARTICULAR; INTRALESIONAL; INTRAMUSCULAR; SOFT TISSUE
Status: SHIPPED | OUTPATIENT
Start: 2023-03-30

## 2023-03-30 RX ORDER — BUPIVACAINE HYDROCHLORIDE 2.5 MG/ML
1 INJECTION, SOLUTION EPIDURAL; INFILTRATION; INTRACAUDAL
Status: SHIPPED | OUTPATIENT
Start: 2023-03-30

## 2023-03-30 RX ADMIN — BUPIVACAINE HYDROCHLORIDE 1 ML: 2.5 INJECTION, SOLUTION EPIDURAL; INFILTRATION; INTRACAUDAL at 16:47

## 2023-03-30 RX ADMIN — METHYLPREDNISOLONE ACETATE 0.5 ML: 40 INJECTION, SUSPENSION INTRA-ARTICULAR; INTRALESIONAL; INTRAMUSCULAR; SOFT TISSUE at 16:47

## 2023-03-30 NOTE — PROGRESS NOTES
Assessment/Plan:     The patient's clinical examination today is consistent with capsulitis of the right first metatarsocuneiform joint  He would like repeat injection therapy as it has worked well for him in the past   After his prepping of skin with alcohol, a steroid injection was administered to the plantar aspect of the first met cuneiform joint without complication  A dry sterile dressing was applied  The use of arch supports to support the first met cuneiform joint  Recommend follow-up in 3 to 4 weeks if he is still having discomfort in his right foot, otherwise he will follow-up on as-needed basis  Diagnoses and all orders for this visit:    Capsulitis of right foot    Right foot pain          Subjective:     Patient ID: Mickey Barrios is a 69 y o  male  The patient presents today for follow-up with [facility] podiatry group with a chief complaint of recurrent right midfoot pain  The patient states he had been doing very well since his last visit in early December 2022  He states that the pain only recently returned within the last couple of weeks  He cannot recall any injury or trauma to the right foot no change in activities or shoe gear  He has been ambulating with a pair of arch supports in his shoes  Review of Systems   Constitutional: Negative  HENT: Negative  Eyes: Negative  Respiratory: Negative  Cardiovascular: Negative  Endocrine: Negative  Musculoskeletal: Negative  Skin: Negative  Neurological: Negative  Hematological: Negative  Psychiatric/Behavioral: Negative  Objective:     Physical Exam  Vitals reviewed  Constitutional:       Appearance: Normal appearance  HENT:      Head: Normocephalic and atraumatic  Nose: Nose normal    Eyes:      Conjunctiva/sclera: Conjunctivae normal       Pupils: Pupils are equal, round, and reactive to light  Cardiovascular:      Pulses:           Dorsalis pedis pulses are 2+ on the right side  "       Posterior tibial pulses are 2+ on the right side  Pulmonary:      Effort: Pulmonary effort is normal    Feet:      Comments: There is mild to moderate tenderness palpation to the plantar aspect of the patient's right first metatarsocuneiform joint; there is some mild palpable edema to the plantar aspect of the joint space; there is no erythema nor ecchymosis; there is minimal tenderness with palpation to the dorsal aspect of the joint  Skin:     General: Skin is warm  Capillary Refill: Capillary refill takes less than 2 seconds  Neurological:      General: No focal deficit present  Mental Status: He is alert and oriented to person, place, and time  Psychiatric:         Mood and Affect: Mood normal          Behavior: Behavior normal          Thought Content: Thought content normal          Small joint arthrocentesis: R intertarsal  Universal Protocol:  Consent: Verbal consent obtained  Risks and benefits: risks, benefits and alternatives were discussed  Consent given by: patient  Time out: Immediately prior to procedure a \"time out\" was called to verify the correct patient, procedure, equipment, support staff and site/side marked as required    Timeout called at: 3/30/2023 4:25 PM   Patient understanding: patient states understanding of the procedure being performed  Patient consent: the patient's understanding of the procedure matches consent given  Patient identity confirmed: verbally with patient and provided demographic data    Supporting Documentation  Indications: pain   Procedure Details  Location: foot - R intertarsal  Needle size: 25 G  Ultrasound guidance: no  Approach: plantar  Medications administered: 1 mL bupivacaine (PF) 0 25 %; 0 5 mL methylPREDNISolone acetate 40 mg/mL    Patient tolerance: patient tolerated the procedure well with no immediate complications  Dressing:  Sterile dressing applied          "

## 2023-08-21 ENCOUNTER — OFFICE VISIT (OUTPATIENT)
Dept: PODIATRY | Facility: CLINIC | Age: 68
End: 2023-08-21
Payer: COMMERCIAL

## 2023-08-21 VITALS
SYSTOLIC BLOOD PRESSURE: 136 MMHG | BODY MASS INDEX: 24.12 KG/M2 | HEART RATE: 80 BPM | WEIGHT: 182 LBS | OXYGEN SATURATION: 97 % | DIASTOLIC BLOOD PRESSURE: 93 MMHG | HEIGHT: 73 IN

## 2023-08-21 DIAGNOSIS — M19.071 DJD (DEGENERATIVE JOINT DISEASE), ANKLE AND FOOT, RIGHT: ICD-10-CM

## 2023-08-21 DIAGNOSIS — M77.8 CAPSULITIS OF RIGHT FOOT: Primary | ICD-10-CM

## 2023-08-21 PROCEDURE — 99212 OFFICE O/P EST SF 10 MIN: CPT | Performed by: PODIATRIST

## 2023-08-21 PROCEDURE — 20600 DRAIN/INJ JOINT/BURSA W/O US: CPT | Performed by: PODIATRIST

## 2023-08-21 RX ORDER — TRIAMCINOLONE ACETONIDE 40 MG/ML
20 INJECTION, SUSPENSION INTRA-ARTICULAR; INTRAMUSCULAR
Status: SHIPPED | OUTPATIENT
Start: 2023-08-21

## 2023-08-21 RX ORDER — BUPIVACAINE HYDROCHLORIDE 2.5 MG/ML
1 INJECTION, SOLUTION EPIDURAL; INFILTRATION; INTRACAUDAL
Status: SHIPPED | OUTPATIENT
Start: 2023-08-21

## 2023-08-21 RX ADMIN — BUPIVACAINE HYDROCHLORIDE 1 ML: 2.5 INJECTION, SOLUTION EPIDURAL; INFILTRATION; INTRACAUDAL at 09:15

## 2023-08-21 RX ADMIN — TRIAMCINOLONE ACETONIDE 20 MG: 40 INJECTION, SUSPENSION INTRA-ARTICULAR; INTRAMUSCULAR at 09:15

## 2023-08-21 NOTE — PROGRESS NOTES
Assessment/Plan:     The patient's clinical examination today is consistent with arthritic pain/capsulitis of the right first metatarsal cuneiform joint. There is no erythema nor edema nor calor noted to the patient's right midfoot. The patient wanted to proceed with repeat injection therapy as he has had good success with it in the past.  After prepping the skin with alcohol and a cortisone injection was administered to the plantar aspect of the first metatarsocuneiform joint without complication.     Continue meloxicam on as-needed basis. Continue supportive shoe gear and OTC arch supports.     Recommend follow-up on an as-needed basis. Diagnoses and all orders for this visit:    Capsulitis of right foot    DJD (degenerative joint disease), ankle and foot, right    Other orders  -     Small joint arthrocentesis          Subjective:     Patient ID: Hawa Wall is a 77 y.o. male. The patient presents today for follow-up with [facility] podiatry group with a chief complaint of recurrent right midfoot pain. The patient states he had been doing very well since his last visit in March 2023. He states that the pain only recently returned within the last couple of weeks.   He cannot recall any injury or trauma to the right foot no change in activities or shoe gear.         PAST MEDICAL HISTORY:  Past Medical History:   Diagnosis Date   • Adrenal adenoma    • Anxiety    • Arthritis    • Cardiac disorder    • Cataracts, bilateral    • COPD (chronic obstructive pulmonary disease) ([address]) 08/2019   • Hearing loss    • HL (hearing loss)    • Polyp of colon    • Testicular cyst    • Thoracic aortic aneurysm (HCC)    • Visual impairment        PAST SURGICAL HISTORY:  Past Surgical History:   Procedure Laterality Date   • APPENDECTOMY     • CATARACT EXTRACTION, BILATERAL     • EYE SURGERY  jan 2019   • HERNIA REPAIR      2   • SHOULDER SURGERY Left         ALLERGIES:  Patient has no known allergies. MEDICATIONS:  Current Outpatient Medications   Medication Sig Dispense Refill   • Cyanocobalamin (Vitamin B 12) 500 MCG TABS Take 500 mcg by mouth daily     • escitalopram (LEXAPRO) 10 mg tablet Take 1 tablet (10 mg total) by mouth daily 90 tablet 1   • fluticasone-salmeterol (Advair Diskus) 100-50 mcg/dose inhaler Inhale 1 puff 2 (two) times a day Rinse mouth after use. 3 blister 3   • meloxicam (Mobic) 7.5 mg tablet Take 1 tablet (7.5 mg total) by mouth daily 30 tablet 2   • Misc Natural Products (Osteo Bi-Flex Triple Strength) TABS Take by mouth daily     • Multiple Vitamins-Minerals (MULTIVITAMIN ADULT PO) Take 1 tablet by mouth daily       Current Facility-Administered Medications   Medication Dose Route Frequency Provider Last Rate Last Admin   • bupivacaine (PF) (MARCAINE) 0.25 % injection 1 mL  1 mL Intra-articular  Ariella Kaitlin White, DPM   1 mL at 22 1454   • bupivacaine (PF) (MARCAINE) 0.25 % injection 1 mL  1 mL Intra-articular  Ariella Kaitlin White, DPM   1 mL at 23 1647   • methylPREDNISolone acetate (DEPO-MEDROL) injection 0.5 mL  0.5 mL Intra-articular  Ariella Kaitlin White, DPM   0.5 mL at 23 1647   • triamcinolone acetonide (KENALOG-40) 40 mg/mL injection 20 mg  20 mg Intra-articular  Ariella Kaitlin White, DPM   20 mg at 22 1454       SOCIAL HISTORY:  Social History     Socioeconomic History   • Marital status: Unknown     Spouse name: None   • Number of children: None   • Years of education: None   • Highest education level: None   Occupational History   • None   Tobacco Use   • Smoking status: Former     Packs/day: 0.00     Years: 0.00     Total pack years: 0.00     Types: Cigarettes     Quit date: 1973     Years since quittin.6   • Smokeless tobacco: Never   Vaping Use   • Vaping Use: Never used   Substance and Sexual Activity   • Alcohol use:  Yes     Alcohol/week: 1.0 standard drink of alcohol     Types: 1 Cans of beer per week   • Drug use: Not Currently Frequency: 1.0 times per week     Types: Marijuana     Comment: 1970's   • Sexual activity: Yes     Partners: Female   Other Topics Concern   • None   Social History Narrative   • None     Social Determinants of Health     Financial Resource Strain: Not on file   Food Insecurity: Not on file   Transportation Needs: Not on file   Physical Activity: Not on file   Stress: Not on file   Social Connections: Not on file   Intimate Partner Violence: Not on file   Housing Stability: Not on file        Review of Systems   Constitutional: Negative. HENT: Negative. Eyes: Negative. Respiratory: Negative. Cardiovascular: Negative. Endocrine: Negative. Musculoskeletal: Negative. Skin: Negative. Neurological: Negative. Hematological: Negative. Psychiatric/Behavioral: Negative. Objective:     Physical Exam  Vitals reviewed. Constitutional:       Appearance: Normal appearance. HENT:      Head: Normocephalic and atraumatic. Nose: Nose normal.   Eyes:      Conjunctiva/sclera: Conjunctivae normal.      Pupils: Pupils are equal, round, and reactive to light. Cardiovascular:      Pulses:           Dorsalis pedis pulses are 2+ on the right side. Posterior tibial pulses are 2+ on the right side. Pulmonary:      Effort: Pulmonary effort is normal.   Musculoskeletal:        Feet:    Feet:      Right foot:      Skin integrity: Skin integrity normal.      Comments: The patient's clinical examination today is consistent with arthritic pain/capsulitis of the right first metatarsal cuneiform joint. There is no erythema nor edema nor calor noted to the patient's right midfoot. Skin:     General: Skin is warm. Capillary Refill: Capillary refill takes less than 2 seconds. Neurological:      General: No focal deficit present. Mental Status: He is alert and oriented to person, place, and time.    Psychiatric:         Mood and Affect: Mood normal.         Behavior: Behavior normal.         Thought Content: Thought content normal.           Small joint arthrocentesis: R intertarsal  Universal Protocol:  Consent: Verbal consent obtained. Risks and benefits: risks, benefits and alternatives were discussed  Consent given by: patient  Time out: Immediately prior to procedure a "time out" was called to verify the correct patient, procedure, equipment, support staff and site/side marked as required. Timeout called at: 8/21/2023 9:30 AM.  Patient understanding: patient states understanding of the procedure being performed  Patient consent: the patient's understanding of the procedure matches consent given  Patient identity confirmed: verbally with patient and provided demographic data    Procedure Details  Location: foot - R intertarsal  Needle size: 25 G  Ultrasound guidance: no  Approach: medial  Medications administered: 1 mL bupivacaine (PF) 0.25 %; 20 mg triamcinolone acetonide 40 mg/mL      The area of the right first metatarsal cuneiform joint was prepped with alcohol. Ethyl chloride was administered for topical anesthesia. I proceeded to inject 1.5 ml 0.25% bupivacaine plain/0.5 ml kenalog 40 via a plantar/medial approach. The patient tolerated it well. A sterile dry Band-Aid was applied postinjection.

## 2023-08-31 ENCOUNTER — TELEPHONE (OUTPATIENT)
Dept: INTERNAL MEDICINE CLINIC | Age: 68
End: 2023-08-31

## 2023-11-08 ENCOUNTER — TELEPHONE (OUTPATIENT)
Dept: INTERNAL MEDICINE CLINIC | Age: 68
End: 2023-11-08

## 2023-11-08 NOTE — TELEPHONE ENCOUNTER
Left message on machine for patient to call and scheduled follow up and medicare annual wellness visit

## 2023-12-01 ENCOUNTER — TELEPHONE (OUTPATIENT)
Dept: INTERNAL MEDICINE CLINIC | Age: 68
End: 2023-12-01

## 2023-12-04 DIAGNOSIS — M79.671 RIGHT FOOT PAIN: ICD-10-CM

## 2023-12-04 NOTE — TELEPHONE ENCOUNTER
Refill must be reviewed and completed by the office or provider.  The refill is unable to be approved by the medication management team.    Labs

## 2023-12-06 DIAGNOSIS — M79.671 RIGHT FOOT PAIN: ICD-10-CM

## 2023-12-06 RX ORDER — MELOXICAM 7.5 MG/1
7.5 TABLET ORAL DAILY
Qty: 30 TABLET | Refills: 0 | Status: SHIPPED | OUTPATIENT
Start: 2023-12-06

## 2023-12-06 RX ORDER — MELOXICAM 7.5 MG/1
7.5 TABLET ORAL DAILY
Qty: 30 TABLET | Refills: 2 | Status: SHIPPED | OUTPATIENT
Start: 2023-12-06 | End: 2023-12-06 | Stop reason: SDUPTHER

## 2024-02-02 ENCOUNTER — RA CDI HCC (OUTPATIENT)
Dept: OTHER | Facility: HOSPITAL | Age: 69
End: 2024-02-02

## 2024-02-08 ENCOUNTER — OFFICE VISIT (OUTPATIENT)
Dept: INTERNAL MEDICINE CLINIC | Facility: OTHER | Age: 69
End: 2024-02-08
Payer: COMMERCIAL

## 2024-02-08 VITALS
SYSTOLIC BLOOD PRESSURE: 134 MMHG | TEMPERATURE: 98.2 F | HEART RATE: 79 BPM | DIASTOLIC BLOOD PRESSURE: 80 MMHG | HEIGHT: 73 IN | WEIGHT: 182 LBS | OXYGEN SATURATION: 99 % | BODY MASS INDEX: 24.12 KG/M2

## 2024-02-08 DIAGNOSIS — I71.23 ANEURYSM OF DESCENDING THORACIC AORTA WITHOUT RUPTURE (HCC): ICD-10-CM

## 2024-02-08 DIAGNOSIS — Z12.5 SCREENING PSA (PROSTATE SPECIFIC ANTIGEN): ICD-10-CM

## 2024-02-08 DIAGNOSIS — Z13.220 SCREENING CHOLESTEROL LEVEL: ICD-10-CM

## 2024-02-08 DIAGNOSIS — R09.82 POST-NASAL DRIP: ICD-10-CM

## 2024-02-08 DIAGNOSIS — H18.512 FUCHS' CORNEAL DYSTROPHY OF LEFT EYE: ICD-10-CM

## 2024-02-08 DIAGNOSIS — J43.2 CENTRILOBULAR EMPHYSEMA (HCC): Primary | ICD-10-CM

## 2024-02-08 DIAGNOSIS — H83.3X2 NOISE-INDUCED HEARING LOSS OF LEFT EAR, UNSPECIFIED HEARING STATUS ON CONTRALATERAL SIDE: ICD-10-CM

## 2024-02-08 DIAGNOSIS — I71.21 ANEURYSM OF ASCENDING AORTA WITHOUT RUPTURE (HCC): ICD-10-CM

## 2024-02-08 PROCEDURE — G0439 PPPS, SUBSEQ VISIT: HCPCS | Performed by: FAMILY MEDICINE

## 2024-02-08 PROCEDURE — 99214 OFFICE O/P EST MOD 30 MIN: CPT | Performed by: FAMILY MEDICINE

## 2024-02-08 RX ORDER — MELATONIN
1000 DAILY
COMMUNITY

## 2024-02-08 NOTE — PROGRESS NOTES
Assessment and Plan:     Problem List Items Addressed This Visit    None       Preventive health issues were discussed with patient, and age appropriate screening tests were ordered as noted in patient's After Visit Summary.  Personalized health advice and appropriate referrals for health education or preventive services given if needed, as noted in patient's After Visit Summary.     History of Present Illness:     Patient presents for a Medicare Wellness Visit    HPI   Patient Care Team:  Lorrie Franco DO as PCP - General  MD rBodie Hicks MD     Review of Systems:     Review of Systems     Problem List:     Patient Active Problem List   Diagnosis    Adrenal adenoma    Anxiety    Thoracic aortic aneurysm without rupture (HCC)    Testicular cyst    Need for hepatitis C screening test    Screening for hyperlipidemia    Screening for prostate cancer    Fuchs' corneal dystrophy    Centrilobular emphysema (HCC)    Abdominal aortic aneurysm (AAA) without rupture (HCC)    Second hand smoke exposure    History of cataract    Thoracic ascending aortic aneurysm (HCC)    Family history of diabetes mellitus    Peripheral tear of medial meniscus of left knee as current injury      Past Medical and Surgical History:     Past Medical History:   Diagnosis Date    Adrenal adenoma     Anxiety     Arthritis     Cardiac disorder     Cataracts, bilateral     COPD (chronic obstructive pulmonary disease) (HCC) 08/2019    Hearing loss     HL (hearing loss)     Polyp of colon     Testicular cyst     Thoracic aortic aneurysm (HCC)     Visual impairment      Past Surgical History:   Procedure Laterality Date    APPENDECTOMY      CATARACT EXTRACTION, BILATERAL      EYE SURGERY  jan 2019    HERNIA REPAIR      2    SHOULDER SURGERY Left       Family History:     Family History   Problem Relation Age of Onset    Hypertension Mother     Hypertension Father     Throat cancer Father     Cancer Father     Cancer Sister       Social  History:     Social History     Socioeconomic History    Marital status: /Civil Union     Spouse name: Not on file    Number of children: Not on file    Years of education: Not on file    Highest education level: Not on file   Occupational History    Not on file   Tobacco Use    Smoking status: Former     Current packs/day: 0.00     Types: Cigarettes     Quit date: 1973     Years since quittin.1    Smokeless tobacco: Never   Vaping Use    Vaping status: Never Used   Substance and Sexual Activity    Alcohol use: Yes     Alcohol/week: 1.0 standard drink of alcohol     Types: 1 Cans of beer per week    Drug use: Not Currently     Frequency: 1.0 times per week     Types: Marijuana     Comment:     Sexual activity: Yes     Partners: Female   Other Topics Concern    Not on file   Social History Narrative    Not on file     Social Determinants of Health     Financial Resource Strain: Low Risk  (2024)    Overall Financial Resource Strain (CARDIA)     Difficulty of Paying Living Expenses: Not hard at all   Food Insecurity: Not on file   Transportation Needs: No Transportation Needs (2024)    PRAPARE - Transportation     Lack of Transportation (Medical): No     Lack of Transportation (Non-Medical): No   Physical Activity: Not on file   Stress: Not on file   Social Connections: Not on file   Intimate Partner Violence: Not on file   Housing Stability: Not on file      Medications and Allergies:     Current Outpatient Medications   Medication Sig Dispense Refill    Cyanocobalamin (Vitamin B 12) 500 MCG TABS Take 500 mcg by mouth daily      escitalopram (LEXAPRO) 10 mg tablet Take 1 tablet (10 mg total) by mouth daily 90 tablet 1    fluticasone-salmeterol (Advair Diskus) 100-50 mcg/dose inhaler Inhale 1 puff 2 (two) times a day Rinse mouth after use. 3 blister 3    meloxicam (Mobic) 7.5 mg tablet Take 1 tablet (7.5 mg total) by mouth daily 30 tablet 0    Misc Natural Products (Osteo Bi-Flex Triple  Strength) TABS Take by mouth daily      Multiple Vitamins-Minerals (MULTIVITAMIN ADULT PO) Take 1 tablet by mouth daily       Current Facility-Administered Medications   Medication Dose Route Frequency Provider Last Rate Last Admin    bupivacaine (PF) (MARCAINE) 0.25 % injection 1 mL  1 mL Intra-articular  Chalo Seeway White, DPM   1 mL at 12/09/22 1454    bupivacaine (PF) (MARCAINE) 0.25 % injection 1 mL  1 mL Intra-articular  Chalo Seeway White, DPM   1 mL at 03/30/23 1647    bupivacaine (PF) (MARCAINE) 0.25 % injection 1 mL  1 mL Intra-articular  Chalo Seeway White, DPM   1 mL at 08/21/23 0915    methylPREDNISolone acetate (DEPO-MEDROL) injection 0.5 mL  0.5 mL Intra-articular  Chalo Seeway White, DPM   0.5 mL at 03/30/23 1647    triamcinolone acetonide (KENALOG-40) 40 mg/mL injection 20 mg  20 mg Intra-articular  Chalo Seeway White, DPM   20 mg at 12/09/22 1454    triamcinolone acetonide (KENALOG-40) 40 mg/mL injection 20 mg  20 mg Intra-articular  Chalo Seeway White, DPM   20 mg at 08/21/23 0915     No Known Allergies   Immunizations:     Immunization History   Administered Date(s) Administered    COVID-19 MODERNA VACC 0.5 ML IM 01/27/2021, 02/24/2021, 05/24/2022    COVID-19 Moderna Vac BIVALENT 12 Yr+ IM 0.5 ML 12/14/2022    INFLUENZA 11/04/2018, 11/08/2020, 11/08/2020, 11/07/2021, 11/07/2021, 11/06/2022    Influenza, high dose seasonal 0.7 mL 11/05/2023    Pneumococcal Conjugate 13-Valent 01/28/2019    Pneumococcal Polysaccharide PPV23 05/24/2021    Tdap 02/10/2022    Zoster 01/14/2016      Health Maintenance:         Topic Date Due    Colorectal Cancer Screening  02/02/2022    Hepatitis C Screening  Completed         Topic Date Due    COVID-19 Vaccine (5 - 2023-24 season) 09/01/2023      Medicare Screening Tests and Risk Assessments:     Yogesh is here for his Subsequent Wellness visit.     Health Risk Assessment:   Patient rates overall health as good. Patient feels that their physical health rating is slightly worse.  Patient is satisfied with their life. Eyesight was rated as much worse. Hearing was rated as much worse. Patient feels that their emotional and mental health rating is same. Patients states they are sometimes angry. Patient states they are sometimes unusually tired/fatigued. Pain experienced in the last 7 days has been some. Patient's pain rating has been 4/10. Patient states that he has experienced no weight loss or gain in last 6 months.     Fall Risk Screening:   In the past year, patient has experienced: no history of falling in past year      Home Safety:  Patient does not have trouble with stairs inside or outside of their home. Patient has working smoke alarms and has working carbon monoxide detector. Home safety hazards include: none.     Nutrition:   Current diet is Regular.     Medications:   Patient is currently taking over-the-counter supplements. OTC medications include: see medication list. Patient is able to manage medications.     Activities of Daily Living (ADLs)/Instrumental Activities of Daily Living (IADLs):   Walk and transfer into and out of bed and chair?: Yes  Dress and groom yourself?: Yes    Bathe or shower yourself?: Yes    Feed yourself? Yes  Do your laundry/housekeeping?: Yes  Manage your money, pay your bills and track your expenses?: Yes  Make your own meals?: Yes    Do your own shopping?: Yes    Previous Hospitalizations:   Any hospitalizations or ED visits within the last 12 months?: No      Advance Care Planning:   Living will: Yes    Durable POA for healthcare: Yes    Advanced directive: Yes      PREVENTIVE SCREENINGS      Cardiovascular Screening:    General: Screening Current      Colorectal Cancer Screening:     General: Screening Current      Abdominal Aortic Aneurysm (AAA) Screening:    Risk factors include: age between 65-74 yo and tobacco use        General: Screening Not Indicated and History AAA      Lung Cancer Screening:     General: Screening Not Indicated       Hepatitis C Screening:    General: Screening Current    Screening, Brief Intervention, and Referral to Treatment (SBIRT)    Screening  Typical number of drinks in a day: 0  Typical number of drinks in a week: 1  Interpretation: Low risk drinking behavior.    AUDIT-C Screenin) How often did you have a drink containing alcohol in the past year? monthly or less  2) How many drinks did you have on a typical day when you were drinking in the past year? 1 to 2  3) How often did you have 6 or more drinks on one occasion in the past year? never    AUDIT-C Score: 1  Interpretation: Score 0-3 (male): Negative screen for alcohol misuse    Single Item Drug Screening:  How often have you used an illegal drug (including marijuana) or a prescription medication for non-medical reasons in the past year? never    Single Item Drug Screen Score: 0  Interpretation: Negative screen for possible drug use disorder    No results found.     Physical Exam:     There were no vitals taken for this visit.    Physical Exam     Lorrie Franco DO

## 2024-02-08 NOTE — PROGRESS NOTES
Assessment/Plan:    1. Centrilobular emphysema (HCC)  -     Comprehensive metabolic panel; Future  -     CBC and differential; Future    2. Noise-induced hearing loss of left ear, unspecified hearing status on contralateral side    3. Screening cholesterol level  -     Lipid Panel with Direct LDL reflex; Future    4. Screening PSA (prostate specific antigen)  -     PSA, Total Screen; Future    5. Aneurysm of descending thoracic aorta without rupture (HCC)  -     CT chest w contrast; Future; Expected date: 02/08/2024    6. Aneurysm of ascending aorta without rupture (HCC)    7. Fuchs' corneal dystrophy of left eye    8. Post-nasal drip    Depression Screening Follow-up Plan: Patient's depression screening was positive with a PHQ-2 score of . Their PHQ-9 score was . Clinically patient does not have depression. No treatment is required.          There are no Patient Instructions on file for this visit.    Return in about 1 year (around 2/8/2025) for Annual physical.    Subjective:      Patient ID: Yogesh Guardado is a 68 y.o. male.    Chief Complaint   Patient presents with    Follow-up     6 month follow up   Decreased hearing in the left ear. And continued head congestion /fog. Has tried Flonase with some help in symptoms.   No recent labs     Medicare Wellness Visit     Sub Medicare Wellness Visit     Health Maint.     Cologuard done 2022 not due     Nevus     Upper left side of forehead has a discolored scab        HPI        Answers submitted by the patient for this visit:  Medicare Annual Wellness Visit (Submitted on 2/4/2024)  How would you rate your overall health?: good  Compared to last year, how is your physical health?: slightly worse  In general, how satisfied are you with your life?: satisfied  Compared to last year, how is your eyesight?: much worse  Compared to last year, how is your hearing?: much worse  Compared to last year, how is your emotional/mental health?: same  How often is anger a problem for  "you?: sometimes  How often do you feel unusually tired/fatigued?: sometimes  In the past 7 days, how much pain have you experienced?: some  If you answered \"some\" or \"a lot\", please rate the severity of your pain on a scale of 1 to 10 (1 being the least severe pain and 10 being the most intense pain).: 4/10  In the past 6 months, have you lost or gained 10 pounds without trying?: No  One or more falls in the last year: No  Do you have trouble with the stairs inside or outside your home?: No  Does your home have working smoke alarms?: Yes  Does your home have a carbon monoxide monitor?: Yes  Which safety hazards (if any) have you experienced in your home? Please select all that apply.: none  How would you describe your current diet? Please select all that apply.: Regular  In addition to prescription medications, are you taking any over-the-counter supplements?: Yes  If yes, what supplements are you taking?: Multi Vitamin, Osteo Bi-Flex, B12 (100 mcg), D3 (1000IU), Magnesium Malate (3750 mg)  Can you manage your medications?: Yes  Are you currently taking any opioid medications?: No  Can you walk and transfer into and out of your bed and chair?: Yes  Can you dress and groom yourself?: Yes  Can you bathe or shower yourself?: Yes  Can you feed yourself?: Yes  Can you do your laundry/ housekeeping?: Yes  Can you manage your money, pay your bills, and track your expenses?: Yes  Can you make your own meals?: Yes  Can you do your own shopping?: Yes  Within the last 12 months, have you had any hospitalizations or Emergency Department visits?: No  Do you have a living will?: Yes  Do you have a Durable POA (Power of ) for healthcare decisions?: Yes  Do you have an Advanced Directive for end of life decisions?: Yes  How often have you used an illegal drug (including marijuana) or a prescription medication for non-medical reasons in the past year?: never  What is the typical number of drinks you consume in a day?: " 0  What is the typical number of drinks you consume in a week?: 1  How often did you have a drink containing alcohol in the past year?: monthly or less  How many drinks did you have on a typical day  when you were drinking in the past year?: 1 to 2  How often did you have 6 or more drinks on one occasion in the past year?: never        Thoracic ascending aorta aneurysm.  Stable at 4 cm on CT scan done in 2019.  He is due for follow-up.  No symptoms. Nov 2021: stable , had repeat Ct scan  June 2022, has never been evaluated by vascular surgery  February 2024, due for follow-up repeat CT scan    COPD.  Smoking history for 1 year but significant secondhand smoke exposure.  Takes his Advair but only daily now and feels well.  No breakthrough symptoms of shortness of breath or wheezing or coughing.  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  Nov 2021: stable, no wheezing and SOB, no MATSON  June 2022, still wheezing, no progression in symptoms  February 2024, not wheezing, no progression in symptoms.  No shortness of breath no dyspnea on exertion.  Stopped taking his inhaler due to this.     Anxiety, taking Lexapro half tablet at 5 mg daily without any breakthrough symptoms.  No HI or SI and sleeping well.  Does not feel that he needs any changes.  Nov 2021: no HI or SI, doing well, no side effects   June 2022, doing well.  No side effects with Lexapro.  No HI or SI.  February 2024, doing well, no breakthrough symptoms, no HI or SI,      Hard of hearing, with hearing aids, left side worse than right, most recently broke his left hearing aid and thinks he may have another problem with his ears as he feels congested and is hearing less        The following portions of the patient's history were reviewed and updated as appropriate: allergies, current medications, past family history, past medical history, past social history, past surgical history and problem  list.    Review of Systems        Constitutional:  Denies fever or chills   Eyes:  Denies double , blurry vision or eye pain  HENT:  Denies nasal congestion, sore throat or new hearing issues  Respiratory:  Denies cough or shortness of breath or wheezing  Cardiovascular:  Denies palpitations or chest pain  GI:  Denies abdominal pain, nausea, or vomiting, no loose stools, no reflux  Integument:  Denies rash , no open areas  Neurologic:  Denies headache or focal weakness, no dizziness  : no dysuria, or hematuria      Current Outpatient Medications   Medication Sig Dispense Refill    cholecalciferol (VITAMIN D3) 1,000 units tablet Take 1,000 Units by mouth daily      Cyanocobalamin (Vitamin B 12) 500 MCG TABS Take 500 mcg by mouth daily      escitalopram (LEXAPRO) 10 mg tablet Take 1 tablet (10 mg total) by mouth daily 90 tablet 1    meloxicam (Mobic) 7.5 mg tablet Take 1 tablet (7.5 mg total) by mouth daily 30 tablet 0    Misc Natural Products (Osteo Bi-Flex Triple Strength) TABS Take by mouth daily      Multiple Vitamins-Minerals (MULTIVITAMIN ADULT PO) Take 1 tablet by mouth daily      fluticasone-salmeterol (Advair Diskus) 100-50 mcg/dose inhaler Inhale 1 puff 2 (two) times a day Rinse mouth after use. (Patient not taking: Reported on 2/8/2024) 3 blister 3     Current Facility-Administered Medications   Medication Dose Route Frequency Provider Last Rate Last Admin    bupivacaine (PF) (MARCAINE) 0.25 % injection 1 mL  1 mL Intra-articular  Chalo [facility] White, DPM   1 mL at 12/09/22 1454    bupivacaine (PF) (MARCAINE) 0.25 % injection 1 mL  1 mL Intra-articular  Chalo Seeway White, DPM   1 mL at 03/30/23 1647    bupivacaine (PF) (MARCAINE) 0.25 % injection 1 mL  1 mL Intra-articular  Chalo Seeway White, DPM   1 mL at 08/21/23 0915    methylPREDNISolone acetate (DEPO-MEDROL) injection 0.5 mL  0.5 mL Intra-articular  Chalo [facility] White, DPM   0.5 mL at 03/30/23 1647    triamcinolone acetonide (KENALOG-40) 40 mg/mL injection  "20 mg  20 mg Intra-articular  Chalo Mayo White, DPM   20 mg at 12/09/22 1454    triamcinolone acetonide (KENALOG-40) 40 mg/mL injection 20 mg  20 mg Intra-articular  Chalo [place] White, DPM   20 mg at 08/21/23 0915       Objective:    /80 (BP Location: Left arm, Patient Position: Sitting, Cuff Size: Adult)   Pulse 79   Temp 98.2 °F (36.8 °C)   Ht 6' 1\" (1.854 m)   Wt 82.6 kg (182 lb)   SpO2 99%   BMI 24.01 kg/m²        Physical Exam      Constitutional:  Well developed, well nourished, no acute distress, non-toxic appearance   Eyes:  PERRL, conjunctiva normal , non icteric sclera  HENT:  Atraumatic, oropharynx moist. Neck-  supple , tympanic membranes without erythema or edema.  No air-fluid levels.  No wax.  Postnasal drip noted in posterior pharynx  Respiratory:  CTA b/l, normal breath sounds, no rales, no wheezing   Cardiovascular:  RRR, no murmurs, no LE edema b/l  GI:  Soft, nondistended, normal bowel sounds x 4, nontender, no organomegaly, no mass, no rebound, no guarding   Neurologic:  no focal deficits noted   Psychiatric:  Speech and behavior appropriate , AAO x 3         Lorrie Franco DO    "

## 2024-02-12 ENCOUNTER — APPOINTMENT (OUTPATIENT)
Dept: LAB | Age: 69
End: 2024-02-12
Payer: COMMERCIAL

## 2024-02-12 DIAGNOSIS — J43.2 CENTRILOBULAR EMPHYSEMA (HCC): ICD-10-CM

## 2024-02-12 DIAGNOSIS — Z12.5 SCREENING PSA (PROSTATE SPECIFIC ANTIGEN): ICD-10-CM

## 2024-02-12 DIAGNOSIS — Z13.220 SCREENING CHOLESTEROL LEVEL: ICD-10-CM

## 2024-02-12 LAB
ALBUMIN SERPL BCP-MCNC: 4.4 G/DL (ref 3.5–5)
ALP SERPL-CCNC: 50 U/L (ref 34–104)
ALT SERPL W P-5'-P-CCNC: 16 U/L (ref 7–52)
ANION GAP SERPL CALCULATED.3IONS-SCNC: 6 MMOL/L
AST SERPL W P-5'-P-CCNC: 16 U/L (ref 13–39)
BASOPHILS # BLD AUTO: 0.06 THOUSANDS/ÂΜL (ref 0–0.1)
BASOPHILS NFR BLD AUTO: 1 % (ref 0–1)
BILIRUB SERPL-MCNC: 0.59 MG/DL (ref 0.2–1)
BUN SERPL-MCNC: 18 MG/DL (ref 5–25)
CALCIUM SERPL-MCNC: 9.6 MG/DL (ref 8.4–10.2)
CHLORIDE SERPL-SCNC: 103 MMOL/L (ref 96–108)
CHOLEST SERPL-MCNC: 175 MG/DL
CO2 SERPL-SCNC: 29 MMOL/L (ref 21–32)
CREAT SERPL-MCNC: 0.81 MG/DL (ref 0.6–1.3)
EOSINOPHIL # BLD AUTO: 0.11 THOUSAND/ÂΜL (ref 0–0.61)
EOSINOPHIL NFR BLD AUTO: 2 % (ref 0–6)
ERYTHROCYTE [DISTWIDTH] IN BLOOD BY AUTOMATED COUNT: 12 % (ref 11.6–15.1)
GFR SERPL CREATININE-BSD FRML MDRD: 90 ML/MIN/1.73SQ M
GLUCOSE P FAST SERPL-MCNC: 103 MG/DL (ref 65–99)
HCT VFR BLD AUTO: 45.7 % (ref 36.5–49.3)
HDLC SERPL-MCNC: 59 MG/DL
HGB BLD-MCNC: 15.5 G/DL (ref 12–17)
IMM GRANULOCYTES # BLD AUTO: 0.01 THOUSAND/UL (ref 0–0.2)
IMM GRANULOCYTES NFR BLD AUTO: 0 % (ref 0–2)
LDLC SERPL CALC-MCNC: 99 MG/DL (ref 0–100)
LYMPHOCYTES # BLD AUTO: 2.16 THOUSANDS/ÂΜL (ref 0.6–4.47)
LYMPHOCYTES NFR BLD AUTO: 42 % (ref 14–44)
MCH RBC QN AUTO: 31.1 PG (ref 26.8–34.3)
MCHC RBC AUTO-ENTMCNC: 33.9 G/DL (ref 31.4–37.4)
MCV RBC AUTO: 92 FL (ref 82–98)
MONOCYTES # BLD AUTO: 0.47 THOUSAND/ÂΜL (ref 0.17–1.22)
MONOCYTES NFR BLD AUTO: 9 % (ref 4–12)
NEUTROPHILS # BLD AUTO: 2.34 THOUSANDS/ÂΜL (ref 1.85–7.62)
NEUTS SEG NFR BLD AUTO: 46 % (ref 43–75)
NRBC BLD AUTO-RTO: 0 /100 WBCS
PLATELET # BLD AUTO: 251 THOUSANDS/UL (ref 149–390)
PMV BLD AUTO: 8.1 FL (ref 8.9–12.7)
POTASSIUM SERPL-SCNC: 4.8 MMOL/L (ref 3.5–5.3)
PROT SERPL-MCNC: 7.3 G/DL (ref 6.4–8.4)
PSA SERPL-MCNC: 1.5 NG/ML (ref 0–4)
RBC # BLD AUTO: 4.99 MILLION/UL (ref 3.88–5.62)
SODIUM SERPL-SCNC: 138 MMOL/L (ref 135–147)
TRIGL SERPL-MCNC: 87 MG/DL
WBC # BLD AUTO: 5.15 THOUSAND/UL (ref 4.31–10.16)

## 2024-02-12 PROCEDURE — 85025 COMPLETE CBC W/AUTO DIFF WBC: CPT

## 2024-02-12 PROCEDURE — 80061 LIPID PANEL: CPT

## 2024-02-12 PROCEDURE — G0103 PSA SCREENING: HCPCS

## 2024-02-12 PROCEDURE — 36415 COLL VENOUS BLD VENIPUNCTURE: CPT

## 2024-02-12 PROCEDURE — 80053 COMPREHEN METABOLIC PANEL: CPT

## 2024-02-16 ENCOUNTER — HOSPITAL ENCOUNTER (OUTPATIENT)
Dept: RADIOLOGY | Age: 69
Discharge: HOME | End: 2024-02-16
Payer: COMMERCIAL

## 2024-02-16 DIAGNOSIS — I71.23 ANEURYSM OF DESCENDING THORACIC AORTA WITHOUT RUPTURE (HCC): ICD-10-CM

## 2024-02-16 PROCEDURE — G1004 CDSM NDSC: HCPCS

## 2024-02-16 PROCEDURE — 71260 CT THORAX DX C+: CPT

## 2024-02-16 RX ADMIN — IOHEXOL 85 ML: 350 INJECTION, SOLUTION INTRAVENOUS at 09:23

## 2024-02-21 PROBLEM — Z11.59 NEED FOR HEPATITIS C SCREENING TEST: Status: RESOLVED | Noted: 2018-12-07 | Resolved: 2024-02-21

## 2024-02-21 PROBLEM — Z12.5 SCREENING FOR PROSTATE CANCER: Status: RESOLVED | Noted: 2018-12-07 | Resolved: 2024-02-21

## 2024-02-21 PROBLEM — Z13.220 SCREENING FOR HYPERLIPIDEMIA: Status: RESOLVED | Noted: 2018-12-07 | Resolved: 2024-02-21

## 2024-02-26 ENCOUNTER — TELEPHONE (OUTPATIENT)
Dept: INTERNAL MEDICINE CLINIC | Facility: OTHER | Age: 69
End: 2024-02-26

## 2024-02-26 DIAGNOSIS — K86.2 PANCREAS CYST: Primary | ICD-10-CM

## 2024-02-26 NOTE — TELEPHONE ENCOUNTER
Please contact patient to inform him that I reviewed the results of the CT scan to which he was found to have a cyst of his pancreas which would require dedicated imaging with an MRI of the pancreas for further evaluation.  MRI order placed.  Lung nodules, adrenal nodule, and liver cysts are stable and essentially unchanged.

## 2024-02-26 NOTE — TELEPHONE ENCOUNTER
[facility] called to inform the CT of the chest done on 2/16/24 came back with significant findings

## 2024-03-05 DIAGNOSIS — J43.2 CENTRILOBULAR EMPHYSEMA (HCC): Primary | ICD-10-CM

## 2024-03-05 RX ORDER — AMOXICILLIN 875 MG/1
875 TABLET, COATED ORAL 2 TIMES DAILY
Qty: 14 TABLET | Refills: 0 | Status: SHIPPED | OUTPATIENT
Start: 2024-03-05 | End: 2024-03-12

## 2024-03-12 DIAGNOSIS — I71.40 ABDOMINAL AORTIC ANEURYSM (AAA) WITHOUT RUPTURE, UNSPECIFIED PART (HCC): Primary | ICD-10-CM

## 2024-03-12 DIAGNOSIS — I71.20 THORACIC AORTIC ANEURYSM WITHOUT RUPTURE, UNSPECIFIED PART (HCC): ICD-10-CM

## 2024-03-12 DIAGNOSIS — J43.2 CENTRILOBULAR EMPHYSEMA (HCC): ICD-10-CM

## 2024-03-12 DIAGNOSIS — I71.21 ANEURYSM OF ASCENDING AORTA WITHOUT RUPTURE (HCC): ICD-10-CM

## 2024-03-13 ENCOUNTER — HOSPITAL ENCOUNTER (OUTPATIENT)
Dept: RADIOLOGY | Facility: HOSPITAL | Age: 69
Discharge: HOME | End: 2024-03-13
Attending: INTERNAL MEDICINE
Payer: COMMERCIAL

## 2024-03-13 DIAGNOSIS — K86.2 PANCREAS CYST: ICD-10-CM

## 2024-03-13 PROCEDURE — A9585 GADOBUTROL INJECTION: HCPCS | Performed by: INTERNAL MEDICINE

## 2024-03-13 PROCEDURE — 74183 MRI ABD W/O CNTR FLWD CNTR: CPT

## 2024-03-13 RX ORDER — GADOBUTROL 604.72 MG/ML
7 INJECTION INTRAVENOUS
Status: COMPLETED | OUTPATIENT
Start: 2024-03-13 | End: 2024-03-13

## 2024-03-13 RX ADMIN — GADOBUTROL 7 ML: 604.72 INJECTION INTRAVENOUS at 21:05

## 2024-03-20 DIAGNOSIS — D49.0 IPMN (INTRADUCTAL PAPILLARY MUCINOUS NEOPLASM): Primary | ICD-10-CM

## 2024-03-20 DIAGNOSIS — K83.8 DILATION OF BILIARY TRACT: ICD-10-CM

## 2024-03-20 DIAGNOSIS — K86.2 PANCREAS CYST: ICD-10-CM

## 2024-03-20 NOTE — PROGRESS NOTES
Contacted patient and reviewed MRI findings with him.  Informed him that I placed a referral to gastroenterology and surgical oncology for further evaluation and management.  He has an appointment this Friday with his PCP to discuss MRI findings in further detail.

## 2024-03-22 ENCOUNTER — OFFICE VISIT (OUTPATIENT)
Dept: INTERNAL MEDICINE CLINIC | Facility: OTHER | Age: 69
End: 2024-03-22
Payer: COMMERCIAL

## 2024-03-22 VITALS
BODY MASS INDEX: 23.72 KG/M2 | TEMPERATURE: 97.8 F | SYSTOLIC BLOOD PRESSURE: 140 MMHG | HEART RATE: 62 BPM | WEIGHT: 179 LBS | OXYGEN SATURATION: 97 % | DIASTOLIC BLOOD PRESSURE: 78 MMHG | HEIGHT: 73 IN

## 2024-03-22 DIAGNOSIS — I70.90 ATHEROSCLEROSIS: ICD-10-CM

## 2024-03-22 DIAGNOSIS — I71.21 ANEURYSM OF ASCENDING AORTA WITHOUT RUPTURE (HCC): ICD-10-CM

## 2024-03-22 DIAGNOSIS — R07.89 OTHER CHEST PAIN: ICD-10-CM

## 2024-03-22 DIAGNOSIS — D49.0 IPMN (INTRADUCTAL PAPILLARY MUCINOUS NEOPLASM): ICD-10-CM

## 2024-03-22 DIAGNOSIS — I71.40 ABDOMINAL AORTIC ANEURYSM (AAA) WITHOUT RUPTURE, UNSPECIFIED PART (HCC): Primary | ICD-10-CM

## 2024-03-22 PROCEDURE — 99214 OFFICE O/P EST MOD 30 MIN: CPT | Performed by: FAMILY MEDICINE

## 2024-03-22 PROCEDURE — G2211 COMPLEX E/M VISIT ADD ON: HCPCS | Performed by: FAMILY MEDICINE

## 2024-03-22 NOTE — PROGRESS NOTES
Assessment/Plan:    1. Abdominal aortic aneurysm (AAA) without rupture, unspecified part (HCC)    2. Aneurysm of ascending aorta without rupture (HCC)  -     Ambulatory Referral to Thoracic Surgery; Future    3. IPMN (intraductal papillary mucinous neoplasm)    4. Other chest pain    5. Atherosclerosis  -     Lipid Panel with Direct LDL reflex; Future; Expected date: 08/01/2024       All of patient's questions and concerns were addressed to the best my ability, recommend evaluation by cardiothoracic surgery as well as GI for EUS for IPMN    Discussed starting low-dose statin to decrease LDL to decrease patient's risk now that he has asked fluoroscopy sclerotic disease on CT imaging.  Family history of CAD in parents and brother, no acute MI history in personal or family history    There are no Patient Instructions on file for this visit.    Return for Next scheduled follow up.    Subjective:      Patient ID: Yogesh Guardado is a 68 y.o. male.    Chief Complaint   Patient presents with    Follow-up     ER follow up, MRI 3/13, LABS IN CHART          DEPRESSION SCREENING, COLONOSCOPY- refused patient did cologuard a year ago        HPI    Patient seen and examined today with his wife after ER visit for chest tightness.  He was doing some heavy lifting and yard work when the weather was warm last week and 2 days afterwards had left-sided chest discomfort went to the hospital for evaluation.  Cardiac workup was done and it was negative, he was advised to take ibuprofen which she is taking at home and he has been feeling well with no shortness of breath no issues and has returned to his normal activities of daily living.  Patient had full cardiac testing in 2019 with normal stress test  Patient is also had some imaging done which has some abnormalities and he has questions about them.  Otherwise he is feeling completely well and at his  baseline      The following portions of the patient's history were reviewed and  updated as appropriate: allergies, current medications, past family history, past medical history, past social history, past surgical history and problem list.    Review of Systems      Constitutional:  Denies fever or chills   Eyes:  Denies double , blurry vision or eye pain  HENT:  Denies nasal congestion, sore throat or new hearing issues  Respiratory:  Denies cough or shortness of breath or wheezing  Cardiovascular:  Denies palpitations or chest pain  GI:  Denies abdominal pain, nausea, or vomiting, no loose stools, no reflux  Integument:  Denies rash , no open areas  Neurologic:  Denies headache or focal weakness, no dizziness  : no dysuria, or hematuria      Current Outpatient Medications   Medication Sig Dispense Refill    cholecalciferol (VITAMIN D3) 1,000 units tablet Take 1,000 Units by mouth daily      Cyanocobalamin (Vitamin B 12) 500 MCG TABS Take 500 mcg by mouth daily      escitalopram (LEXAPRO) 10 mg tablet Take 1 tablet (10 mg total) by mouth daily 90 tablet 1    Misc Natural Products (Osteo Bi-Flex Triple Strength) TABS Take by mouth daily      Multiple Vitamins-Minerals (MULTIVITAMIN ADULT PO) Take 1 tablet by mouth daily      meloxicam (Mobic) 7.5 mg tablet Take 1 tablet (7.5 mg total) by mouth daily (Patient not taking: Reported on 3/22/2024) 30 tablet 0     Current Facility-Administered Medications   Medication Dose Route Frequency Provider Last Rate Last Admin    bupivacaine (PF) (MARCAINE) 0.25 % injection 1 mL  1 mL Intra-articular  Chalo Huber White, DPM   1 mL at 12/09/22 1454    bupivacaine (PF) (MARCAINE) 0.25 % injection 1 mL  1 mL Intra-articular  Chalo Huber White, DPM   1 mL at 03/30/23 1647    bupivacaine (PF) (MARCAINE) 0.25 % injection 1 mL  1 mL Intra-articular  Chalo Mayo White, DPM   1 mL at 08/21/23 0915    methylPREDNISolone acetate (DEPO-MEDROL) injection 0.5 mL  0.5 mL Intra-articular  Chalo Huber White, DPM   0.5 mL at 03/30/23 1647    triamcinolone acetonide (KENALOG-40)  "40 mg/mL injection 20 mg  20 mg Intra-articular  Chalo Foxjorje White, DPM   20 mg at 12/09/22 1454    triamcinolone acetonide (KENALOG-40) 40 mg/mL injection 20 mg  20 mg Intra-articular  Chalo [place] White, DPM   20 mg at 08/21/23 0915       Objective:    /78 (BP Location: Left arm, Patient Position: Sitting, Cuff Size: Standard)   Pulse 62   Temp 97.8 °F (36.6 °C) (Temporal)   Ht 6' 1\" (1.854 m)   Wt 81.2 kg (179 lb)   SpO2 97%   BMI 23.62 kg/m²        Physical Exam      Constitutional:  Well developed, well nourished, no acute distress, non-toxic appearance   Eyes:  PERRL, conjunctiva normal , non icteric sclera  HENT:  Atraumatic, oropharynx moist. Neck-  supple   Respiratory:  CTA b/l, normal breath sounds, no rales, no wheezing   Cardiovascular:  RRR, no murmurs, no LE edema b/l  GI:  Soft, nondistended, normal bowel sounds x 4, nontender, no organomegaly, no mass, no rebound, no guarding   Neurologic:  no focal deficits noted   Psychiatric:  Speech and behavior appropriate , AAO x 3         Lorrie Franco DO  "

## 2024-03-25 ENCOUNTER — TELEPHONE (OUTPATIENT)
Dept: HEMATOLOGY ONCOLOGY | Facility: CLINIC | Age: 69
End: 2024-03-25

## 2024-03-25 ENCOUNTER — CONSULT (OUTPATIENT)
Dept: GASTROENTEROLOGY | Facility: AMBULARY SURGERY CENTER | Age: 69
End: 2024-03-25
Payer: COMMERCIAL

## 2024-03-25 VITALS
OXYGEN SATURATION: 98 % | HEART RATE: 65 BPM | BODY MASS INDEX: 24.76 KG/M2 | SYSTOLIC BLOOD PRESSURE: 138 MMHG | DIASTOLIC BLOOD PRESSURE: 68 MMHG | WEIGHT: 186.8 LBS | HEIGHT: 73 IN

## 2024-03-25 DIAGNOSIS — D49.0 IPMN (INTRADUCTAL PAPILLARY MUCINOUS NEOPLASM): ICD-10-CM

## 2024-03-25 DIAGNOSIS — K86.2 PANCREAS CYST: ICD-10-CM

## 2024-03-25 DIAGNOSIS — K83.8 DILATION OF BILIARY TRACT: ICD-10-CM

## 2024-03-25 PROCEDURE — 99204 OFFICE O/P NEW MOD 45 MIN: CPT | Performed by: INTERNAL MEDICINE

## 2024-03-25 NOTE — TELEPHONE ENCOUNTER
I called Yogesh in response to a referral that was received for patient to establish care with Thoracic Surgery.     Outreach was made to complete patient's intake questionnaire .    I left a voicemail explaining the reason for my call and advised patient to call [facility] at [phone].  Another attempt will be made to contact patient.

## 2024-03-25 NOTE — PATIENT INSTRUCTIONS
Procedure: EUS  Scheduled date of procedure (as of today): June 28,2024  Physician performing procedure: Dr. Welch  Location of procedure: [facility]  Instructions reviewed with patient by: Taryn  Clearances: N/A

## 2024-03-25 NOTE — PROGRESS NOTES
[facility]ultation -  [facility]  Yogesh Guardado 68 y.o. male MRN: [id number]          Assessment & Plan:  Milagro 68-year-old gentleman, recently found to have pancreatic ductal dilatation on MRI.    1.  Dilated pancreatic duct: Possible main duct IPMN versus cyst, chronic pancreatitis is also on the differential  -Recommend checking EUS in 3 months time to reevaluate  -Discussed with the patient that we can monitor this until pancreatic duct is approximately 10 mm  -Will evaluate the common bile duct at that time as well    2.  History of colon polyps:  -Patient had 3 tubular adenomas in 2015, he should really have a colonoscopy at this time despite negative Cologuard  -Will address at next follow-up    Bill was seen today for consult.    Diagnoses and all orders for this visit:    IPMN (intraductal papillary mucinous neoplasm)  -     Ambulatory Referral to Gastroenterology  -     Endoscopic ultrasonography, GI (Upper); Future    Pancreas cyst  -     Ambulatory Referral to Gastroenterology  -     Endoscopic ultrasonography, GI (Upper); Future    Dilation of biliary tract  -     Ambulatory Referral to Gastroenterology    Other orders  -     Diet NPO; Sips with meds; Standing  -     Void on call to OR; Standing            _____________________________________________________________        CC: Abnormal MRI    HPI:  Yogesh Guardado is a 68 y.o.male who was referred for evaluation of abnormal MRI.  This is a pleasant 68-year-old gentleman, has a history of aortic aneurysm, he was undergoing surveillance for this and pancreatic dilation was noted.  He had a follow-up MRI which demonstrated 6 mm wide by 2.3 cm long area of the main pancreatic duct which was dilated.  Patient's common bile duct was dilated 10 mm but this has been stable since 2019.  Patient denies any abdominal pain, denies any prior history of pancreatitis.  He is otherwise healthy with no significant GI complaints.  Denies any nausea,  vomiting, heartburn, dysphagia, diarrhea, constipation, melena, rectal bleeding.    Last colonoscopy was in 2015, had polyps at that time.    Had a negative Cologuard 1 or 2 years ago.    Surgical history notable for appendectomy, rotator cuff and carpal tunnel surgery.  He is also had hernia repair    Denies any tobacco, drinks 1 drink per week.  He is retired previously worked in [employer].    Family history is notable for throat cancer in father, sister with stomach cancer.        ROS:  The remainder of the ROS was negative except for the pertinent positives mentioned in HPI.         Allergies: Patient has no known allergies.    Medications:   Current Outpatient Medications:     cholecalciferol (VITAMIN D3) 1,000 units tablet, Take 1,000 Units by mouth daily, Disp: , Rfl:     Cyanocobalamin (Vitamin B 12) 500 MCG TABS, Take 500 mcg by mouth daily, Disp: , Rfl:     escitalopram (LEXAPRO) 10 mg tablet, Take 1 tablet (10 mg total) by mouth daily, Disp: 90 tablet, Rfl: 1    Misc Natural Products (Osteo Bi-Flex Triple Strength) TABS, Take by mouth daily, Disp: , Rfl:     Multiple Vitamins-Minerals (MULTIVITAMIN ADULT PO), Take 1 tablet by mouth daily, Disp: , Rfl:     meloxicam (Mobic) 7.5 mg tablet, Take 1 tablet (7.5 mg total) by mouth daily (Patient not taking: Reported on 3/22/2024), Disp: 30 tablet, Rfl: 0    Current Facility-Administered Medications:     bupivacaine (PF) (MARCAINE) 0.25 % injection 1 mL, 1 mL, Intra-articular, , Chalo White, DPM, 1 mL at 12/09/22 1454    bupivacaine (PF) (MARCAINE) 0.25 % injection 1 mL, 1 mL, Intra-articular, Chalo, DPM, 1 mL at 03/30/23 1647    bupivacaine (PF) (MARCAINE) 0.25 % injection 1 mL, 1 mL, Intra-articular, Chalo, DPM, 1 mL at 08/21/23 0915    methylPREDNISolone acetate (DEPO-MEDROL) injection 0.5 mL, 0.5 mL, Intra-articular, Chalo, DPM, 0.5 mL at 03/30/23 1647    triamcinolone acetonide (KENALOG-40) 40 mg/mL injection  "20 mg, 20 mg, Intra-articular, , Chalo Braxtonjorje White, DPM, 20 mg at 12/09/22 1454    triamcinolone acetonide (KENALOG-40) 40 mg/mL injection 20 mg, 20 mg, Intra-articular, , Chalo Braxtonjorje White, DPM, 20 mg at 08/21/23 0915'    Past Medical History:   Diagnosis Date    Adrenal adenoma     Anxiety     Arthritis     Cardiac disorder     Cataracts, bilateral     COPD (chronic obstructive pulmonary disease) (HCC) 08/2019    Hearing loss     HL (hearing loss)     Polyp of colon     Testicular cyst     Thoracic aortic aneurysm (HCC)     Visual impairment        Past Surgical History:   Procedure Laterality Date    APPENDECTOMY      CATARACT EXTRACTION, BILATERAL      EYE SURGERY  jan 2019    HERNIA REPAIR      2    SHOULDER SURGERY Left        Family History   Problem Relation Age of Onset    Hypertension Mother     Hypertension Father     Throat cancer Father     Cancer Father     Cancer Sister         reports that he quit smoking about 51 years ago. His smoking use included cigarettes. He has never used smokeless tobacco. He reports current alcohol use of about 1.0 standard drink of alcohol per week. He reports that he does not currently use drugs after having used the following drugs: Marijuana. Frequency: 1.00 time per week.          Physical Exam:     /68 (BP Location: Left arm, Patient Position: Sitting, Cuff Size: Standard)   Pulse 65   Ht 6' 1\" (1.854 m)   Wt 84.7 kg (186 lb 12.8 oz)   SpO2 98%   BMI 24.65 kg/m²     Gen: wn/wd, NAD, healthy-appearing gentleman  HEENT: anicteric, MMM, no cervical LAD  CVS: RRR, no m/r/g  CHEST: CTA b/l  ABD: +BS, soft, NT,ND, no hepatosplenomegaly  EXT: no c/c/e  NEURO: aaox3  SKIN: NO rashes    "

## 2024-03-26 ENCOUNTER — OFFICE VISIT (OUTPATIENT)
Dept: PODIATRY | Facility: CLINIC | Age: 69
End: 2024-03-26
Payer: COMMERCIAL

## 2024-03-26 VITALS
HEART RATE: 69 BPM | WEIGHT: 186 LBS | SYSTOLIC BLOOD PRESSURE: 136 MMHG | BODY MASS INDEX: 24.65 KG/M2 | HEIGHT: 73 IN | DIASTOLIC BLOOD PRESSURE: 80 MMHG | OXYGEN SATURATION: 97 %

## 2024-03-26 DIAGNOSIS — M19.071 DJD (DEGENERATIVE JOINT DISEASE), ANKLE AND FOOT, RIGHT: Primary | ICD-10-CM

## 2024-03-26 DIAGNOSIS — M25.871 SESAMOIDITIS OF RIGHT FOOT: ICD-10-CM

## 2024-03-26 PROCEDURE — 20550 NJX 1 TENDON SHEATH/LIGAMENT: CPT | Performed by: PODIATRIST

## 2024-03-26 PROCEDURE — 99212 OFFICE O/P EST SF 10 MIN: CPT | Performed by: PODIATRIST

## 2024-03-26 PROCEDURE — 20600 DRAIN/INJ JOINT/BURSA W/O US: CPT | Performed by: PODIATRIST

## 2024-03-26 RX ORDER — TRIAMCINOLONE ACETONIDE 40 MG/ML
20 INJECTION, SUSPENSION INTRA-ARTICULAR; INTRAMUSCULAR
Status: SHIPPED | OUTPATIENT
Start: 2024-03-26

## 2024-03-26 RX ORDER — BUPIVACAINE HYDROCHLORIDE 5 MG/ML
1 INJECTION, SOLUTION PERINEURAL
Status: SHIPPED | OUTPATIENT
Start: 2024-03-26

## 2024-03-26 RX ORDER — BUPIVACAINE HYDROCHLORIDE 2.5 MG/ML
1 INJECTION, SOLUTION INFILTRATION; PERINEURAL
Status: SHIPPED | OUTPATIENT
Start: 2024-03-26

## 2024-03-26 RX ADMIN — BUPIVACAINE HYDROCHLORIDE 1 ML: 2.5 INJECTION, SOLUTION INFILTRATION; PERINEURAL at 09:15

## 2024-03-26 RX ADMIN — BUPIVACAINE HYDROCHLORIDE 1 ML: 5 INJECTION, SOLUTION PERINEURAL at 09:15

## 2024-03-26 RX ADMIN — TRIAMCINOLONE ACETONIDE 20 MG: 40 INJECTION, SUSPENSION INTRA-ARTICULAR; INTRAMUSCULAR at 09:15

## 2024-03-26 NOTE — PROGRESS NOTES
Assessment/Plan:     The patient's clinical examination today is consistent with arthritic pain/capsulitis of the right first metatarsal cuneiform joint, more localized to the dorsal aspect of the joint today. There is no erythema nor edema nor calor noted to the patient's right midfoot.  Moderate tenderness to palpation is also noted to the tibial sesamoid today without any erythema nor ecchymosis nor calor.     The patient wanted to proceed with repeat injection therapy of his arthritic midfoot joint as well as the tibial sesamoid today.  After prepping the skin with Betadine and alcohol and a cortisone injection was administered to the dorso-medial aspect of the first metatarsocuneiform joint without complication.  A second CSI was administered to the area of the tibial sesamoid of the right foot.  That injection was well-tolerated as well.     Continue meloxicam on as-needed basis.  Continue supportive shoe gear and OTC arch supports.     Recommend follow-up on an as-needed basis.        Diagnoses and all orders for this visit:    DJD (degenerative joint disease), ankle and foot, right    Sesamoiditis of right foot    Other orders  -     Small joint arthrocentesis  -     Foot/lower extremity injection          Subjective:     Patient ID: Yogesh Guardado is a 68 y.o. male.    The patient presents today for follow-up of a increasingly painful arthritic joint to his right midfoot.  The patient also notes some tenderness localized to the plantar aspect of his right great toe joint in the area of the sesamoids.  There is no history of injury or trauma to the right foot.  He did note excellent relief with injection therapy after his last visit.  He would like repeat injection therapy today to both areas of tenderness on his right foot.      PAST MEDICAL HISTORY:  Past Medical History:   Diagnosis Date    Adrenal adenoma     Anxiety     Arthritis     Cardiac disorder     Cataracts, bilateral     COPD (chronic  obstructive pulmonary disease) (HCC) 08/2019    Hearing loss     HL (hearing loss)     Polyp of colon     Testicular cyst     Thoracic aortic aneurysm (HCC)     Visual impairment        PAST SURGICAL HISTORY:  Past Surgical History:   Procedure Laterality Date    APPENDECTOMY      CATARACT EXTRACTION, BILATERAL      EYE SURGERY  jan 2019    HERNIA REPAIR      2    SHOULDER SURGERY Left         ALLERGIES:  Patient has no known allergies.    MEDICATIONS:  Current Outpatient Medications   Medication Sig Dispense Refill    cholecalciferol (VITAMIN D3) 1,000 units tablet Take 1,000 Units by mouth daily      Cyanocobalamin (Vitamin B 12) 500 MCG TABS Take 500 mcg by mouth daily      escitalopram (LEXAPRO) 10 mg tablet Take 1 tablet (10 mg total) by mouth daily 90 tablet 1    Misc Natural Products (Osteo Bi-Flex Triple Strength) TABS Take by mouth daily      Multiple Vitamins-Minerals (MULTIVITAMIN ADULT PO) Take 1 tablet by mouth daily      meloxicam (Mobic) 7.5 mg tablet Take 1 tablet (7.5 mg total) by mouth daily (Patient not taking: Reported on 3/22/2024) 30 tablet 0     Current Facility-Administered Medications   Medication Dose Route Frequency Provider Last Rate Last Admin    bupivacaine (PF) (MARCAINE) 0.25 % injection 1 mL  1 mL Intra-articular  Chalo Huber White, DPM   1 mL at 12/09/22 1454    bupivacaine (PF) (MARCAINE) 0.25 % injection 1 mL  1 mL Intra-articular  Chalo Huber White, DPM   1 mL at 03/30/23 1647    bupivacaine (PF) (MARCAINE) 0.25 % injection 1 mL  1 mL Intra-articular  Chalo Huber White, DPM   1 mL at 08/21/23 0915    methylPREDNISolone acetate (DEPO-MEDROL) injection 0.5 mL  0.5 mL Intra-articular  Chalo Huber White, DPM   0.5 mL at 03/30/23 1647    triamcinolone acetonide (KENALOG-40) 40 mg/mL injection 20 mg  20 mg Intra-articular  Chalo Huber White, DPM   20 mg at 12/09/22 1454    triamcinolone acetonide (KENALOG-40) 40 mg/mL injection 20 mg  20 mg Intra-articular  Chalo Huber White, DPM   20 mg at  23 0915       SOCIAL HISTORY:  Social History     Socioeconomic History    Marital status: /Civil Union     Spouse name: None    Number of children: None    Years of education: None    Highest education level: None   Occupational History    None   Tobacco Use    Smoking status: Former     Current packs/day: 0.00     Types: Cigarettes     Quit date: 1973     Years since quittin.2    Smokeless tobacco: Never   Vaping Use    Vaping status: Never Used   Substance and Sexual Activity    Alcohol use: Yes     Alcohol/week: 1.0 standard drink of alcohol     Types: 1 Cans of beer per week    Drug use: Not Currently     Frequency: 1.0 times per week     Types: Marijuana     Comment:     Sexual activity: Yes     Partners: Female   Other Topics Concern    None   Social History Narrative    None     Social Determinants of Health     Financial Resource Strain: Low Risk  (2024)    Overall Financial Resource Strain (CARDIA)     Difficulty of Paying Living Expenses: Not very hard   Food Insecurity: Not on file   Transportation Needs: No Transportation Needs (2024)    PRAPARE - Transportation     Lack of Transportation (Medical): No     Lack of Transportation (Non-Medical): No   Physical Activity: Not on file   Stress: Not on file   Social Connections: Not on file   Intimate Partner Violence: Not on file   Housing Stability: Not on file        Review of Systems   Constitutional: Negative.    HENT: Negative.     Eyes: Negative.    Respiratory: Negative.     Cardiovascular: Negative.    Endocrine: Negative.    Musculoskeletal: Negative.    Neurological: Negative.    Hematological: Negative.    Psychiatric/Behavioral: Negative.           Objective:     Physical Exam  Vitals reviewed.   Constitutional:       Appearance: Normal appearance.   HENT:      Head: Normocephalic and atraumatic.      Nose: Nose normal.   Eyes:      Conjunctiva/sclera: Conjunctivae normal.      Pupils: Pupils are equal, round,  "and reactive to light.   Cardiovascular:      Pulses:           Dorsalis pedis pulses are 2+ on the right side.        Posterior tibial pulses are 2+ on the right side.   Pulmonary:      Effort: Pulmonary effort is normal.   Musculoskeletal:        Feet:    Feet:      Comments: The patient's clinical examination today is consistent with arthritic pain/capsulitis of the right first metatarsal cuneiform joint, more localized to the dorsal aspect of the joint today. There is no erythema nor edema nor calor noted to the patient's right midfoot.  Moderate tenderness to palpation is also noted to the tibial sesamoid today without any erythema nor ecchymosis nor calor.  Skin:     General: Skin is warm.      Capillary Refill: Capillary refill takes less than 2 seconds.   Neurological:      General: No focal deficit present.      Mental Status: He is alert and oriented to person, place, and time.   Psychiatric:         Mood and Affect: Mood normal.         Behavior: Behavior normal.         Thought Content: Thought content normal.         Small joint arthrocentesis: R intertarsal  Universal Protocol:  Consent: Verbal consent obtained.  Risks and benefits: risks, benefits and alternatives were discussed  Consent given by: patient  Time out: Immediately prior to procedure a \"time out\" was called to verify the correct patient, procedure, equipment, support staff and site/side marked as required.  Timeout called at: 3/26/2024 9:30 AM.  Patient understanding: patient states understanding of the procedure being performed  Patient consent: the patient's understanding of the procedure matches consent given  Patient identity confirmed: verbally with patient and provided demographic data  Supporting Documentation  Indications: pain   Procedure Details  Location: foot - R intertarsal  Preparation: Patient was prepped and draped in the usual sterile fashion  Needle size: 25 G  Ultrasound guidance: no  Approach: dorsal  Medications " administered: 1 mL bupivacaine 0.5 %; 20 mg triamcinolone acetonide 40 mg/mL    Patient tolerance: patient tolerated the procedure well with no immediate complications      Foot/lower extremity injection    Performed by: Chalo White DPM  Authorized by: Chalo White DPM    Procedure:     Other Assisting Provider: No      Verbal consent obtained?: Yes      Risks and benefits: Risks, benefits and alternatives were discussed      Consent given by:  Patient    Time out: Immediately prior to the procedure a time out was called      Time out performed at:  3/27/2024 9:35 AM    Patient states understanding of procedure being performed: Yes      Patient's understanding of procedure matches consent: Yes      Patient identity confirmed:  Verbally with patient and provided demographic data    Supporting Documentation:     Indications:  Pain    Procedure Details:    Prep: patient was prepped and draped in usual sterile fashion                Ethyl Chloride was applied      Needle size: 25 G G    Ultrasound Guidance: no      Approach:  Medial    Laterality:  Right    Cyst Aspiration/Injection: No      Location: ligament      Ligament(s) Injected:  Sesamoidal ligament    Injection Information:       Medications:  1 mL bupivacaine 0.25 %; 20 mg triamcinolone acetonide 40 mg/mL    Patient tolerance:  Patient tolerated the procedure well with no immediate complications

## 2024-04-08 ENCOUNTER — TELEPHONE (OUTPATIENT)
Dept: CARDIAC SURGERY | Facility: CLINIC | Age: 69
End: 2024-04-08

## 2024-04-11 ENCOUNTER — OFFICE VISIT (OUTPATIENT)
Dept: CARDIAC SURGERY | Facility: CLINIC | Age: 69
End: 2024-04-11
Payer: COMMERCIAL

## 2024-04-11 VITALS
HEART RATE: 78 BPM | WEIGHT: 183 LBS | OXYGEN SATURATION: 97 % | BODY MASS INDEX: 24.25 KG/M2 | SYSTOLIC BLOOD PRESSURE: 149 MMHG | DIASTOLIC BLOOD PRESSURE: 85 MMHG | HEIGHT: 73 IN

## 2024-04-11 DIAGNOSIS — I10 ESSENTIAL HYPERTENSION: Primary | ICD-10-CM

## 2024-04-11 DIAGNOSIS — I71.21 AORTIC ROOT ANEURYSM (HCC): ICD-10-CM

## 2024-04-11 DIAGNOSIS — I71.21 ANEURYSM OF ASCENDING AORTA WITHOUT RUPTURE (HCC): Primary | ICD-10-CM

## 2024-04-11 PROCEDURE — 99205 OFFICE O/P NEW HI 60 MIN: CPT | Performed by: THORACIC SURGERY (CARDIOTHORACIC VASCULAR SURGERY)

## 2024-04-11 RX ORDER — LISINOPRIL 10 MG/1
10 TABLET ORAL DAILY
Qty: 90 TABLET | Refills: 1 | Status: SHIPPED | OUTPATIENT
Start: 2024-04-11

## 2024-04-11 RX ORDER — LISINOPRIL 10 MG/1
10 TABLET ORAL DAILY
Qty: 90 TABLET | Refills: 1 | Status: SHIPPED | OUTPATIENT
Start: 2024-04-11 | End: 2024-04-11

## 2024-04-11 NOTE — LETTER
April 11, 2024     Lorrie Franco DO  [address]  [address]  [facility] [address]    Patient: Yogesh Guardado   YOB: 1955   Date of Visit: 4/11/2024       Dear Dr. Franco:    Thank you for referring Yogesh Guardado to me for evaluation. Below are my notes for this consultation.    If you have questions, please do not hesitate to call me. I look forward to following your patient along with you.         Sincerely,        John Horne MD        CC: No Recipients    Allison Schulz PA-C  4/11/2024  1:39 PM  Attested  Consultation - Cardiothoracic Surgery   Yogesh Guardado 68 y.o. male MRN: [id number]    Physician Requesting Consult: Salvador    Reason for Consult / Principal Problem: Aortic aneurysm, ascending and root     History of Present Illness: Yogesh Guardado is a 68 y.o. year old male who presents today for surgical consultation for ascending aortic aneurysm.  Bill had an incidental finding from imaging in 2010.  He was getting outpatient imaging with his PCP every 2 - 3 years.  He had chest pain while riding his bike after doing vigorous yard work and had a workup at [facility] including CTA.  The workup did not result in cardiac causes and there was no determination of cause, thought to be of soreness of from physical labor. His aortic root size was larger to 49 mm and he is now referred to the office for evaluation.      He is here with his wife.  They live in [place], [place] and have an active lifestyle, they bike ride [place] the [place] they enjoy doing yard work outside and building outside.  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  He is a retired semi conductor manager.  He is a non smoker, he did grow up in a home with long term significant smokers in his parents.  He has about 1 drink a week and no illegal drug use.  He has no known family history of sudden death or aneurysms in his family.  His CTA scan showed a dilated pancreatic duct and had further work up and he is having EUS on   at [facility].     Past Medical History:  Past Medical History:   Diagnosis Date   • Adrenal adenoma    • Anxiety    • Arthritis    • Cardiac disorder    • Cataracts, bilateral    • COPD (chronic obstructive pulmonary disease) (HCC) 2019   • Hearing loss    • HL (hearing loss)    • Polyp of colon    • Testicular cyst    • Thoracic aortic aneurysm (HCC)    • Visual impairment      Past Surgical History:   Past Surgical History:   Procedure Laterality Date   • APPENDECTOMY     • CATARACT EXTRACTION, BILATERAL     • EYE SURGERY  2019   • HERNIA REPAIR      2   • SHOULDER SURGERY Left      Family History:  Family History   Problem Relation Age of Onset   • Hypertension Mother    • Hypertension Father    • Throat cancer Father    • Cancer Father    • Cancer Sister    • Coronary artery disease Brother      Social History:    Social History     Substance and Sexual Activity   Alcohol Use Yes   • Alcohol/week: 1.0 standard drink of alcohol   • Types: 1 Cans of beer per week    Comment: 1 beer a week     Social History     Substance and Sexual Activity   Drug Use Not Currently   • Frequency: 1.0 times per week   • Types: Marijuana    Comment:      Social History     Tobacco Use   Smoking Status Former   • Current packs/day: 0.00   • Types: Cigarettes   • Quit date: 1973   • Years since quittin.3   Smokeless Tobacco Never       Marital Status: [unfilled]    Home Medications:   Prior to Admission medications    Medication Sig Start Date End Date Taking? Authorizing Provider   cholecalciferol (VITAMIN D3) 1,000 units tablet Take 1,000 Units by mouth daily    Historical Provider, MD   Cyanocobalamin (Vitamin B 12) 500 MCG TABS Take 500 mcg by mouth daily    Historical Provider, MD   escitalopram (LEXAPRO) 10 mg tablet Take 1 tablet (10 mg total) by mouth daily 21   Lorrie Franco DO   meloxicam (Mobic) 7.5 mg tablet Take 1 tablet (7.5 mg total) by mouth daily  Patient not taking: Reported on  "3/22/2024 12/6/23   Chalo Huber Christopher, DPM   Misc Natural Products (Osteo Bi-Flex Triple Strength) TABS Take by mouth daily 10/1/20   Historical Provider, MD   Multiple Vitamins-Minerals (MULTIVITAMIN ADULT PO) Take 1 tablet by mouth daily    Historical Provider, MD       Allergies:  No Known Allergies    Review of Systems:     Review of Systems   Constitutional: Negative.    HENT: Negative.     Eyes: Negative.    Respiratory: Negative.     Cardiovascular: Negative.    Gastrointestinal: Negative.    Endocrine: Negative.    Genitourinary: Negative.    Musculoskeletal: Negative.    Skin: Negative.    Allergic/Immunologic: Negative.    Neurological: Negative.    Hematological: Negative.    Psychiatric/Behavioral: Negative.         Vital Signs:     Vitals:    04/11/24 1253 04/11/24 1258   BP: 142/80 149/85   BP Location: Left arm Right arm   Patient Position: Sitting Sitting   Cuff Size: Standard Standard   Pulse: 78    SpO2: 97%    Weight: 83 kg (183 lb)    Height: 6' 1\" (1.854 m)        Physical Exam:     Physical Exam  Constitutional:       General: He is not in acute distress.     Appearance: Normal appearance. He is normal weight. He is not ill-appearing, toxic-appearing or diaphoretic.   HENT:      Head: Normocephalic and atraumatic.      Right Ear: External ear normal.      Left Ear: External ear normal.      Nose: Nose normal.      Mouth/Throat:      Mouth: Mucous membranes are moist.      Pharynx: Oropharynx is clear. No oropharyngeal exudate or posterior oropharyngeal erythema.   Eyes:      General:         Right eye: No discharge.         Left eye: No discharge.      Extraocular Movements: Extraocular movements intact.      Conjunctiva/sclera: Conjunctivae normal.      Pupils: Pupils are equal, round, and reactive to light.   Neck:      Vascular: No carotid bruit.   Cardiovascular:      Rate and Rhythm: Normal rate and regular rhythm.      Pulses: Normal pulses.      Heart sounds: No murmur heard.  Pulmonary: " "     Effort: Pulmonary effort is normal. No respiratory distress.      Breath sounds: Normal breath sounds. No wheezing or rales.   Abdominal:      General: Bowel sounds are normal. There is no distension.      Palpations: Abdomen is soft.      Tenderness: There is no abdominal tenderness. There is no guarding.   Musculoskeletal:      Cervical back: Normal range of motion and neck supple.      Right lower leg: No edema.      Left lower leg: No edema.   Skin:     General: Skin is warm and dry.      Coloration: Skin is not pale.      Findings: No erythema or rash.   Neurological:      General: No focal deficit present.      Mental Status: He is alert and oriented to person, place, and time.      Sensory: No sensory deficit.      Coordination: Coordination normal.   Psychiatric:         Mood and Affect: Mood normal.         Behavior: Behavior normal.         Thought Content: Thought content normal.         Judgment: Judgment normal.         Lab Results:               Invalid input(s): \"LABGLOM\"      No results found for: \"HGBA1C\"  Lab Results   Component Value Date    TROPONINI <0.02 08/26/2019       Imaging Studies:     CT Chest:   Impression    Impression:  1.  Aneurysmal dilatation of ascending thoracic aorta including the aortic root,  the latter which measures up to 4.8 cm in maximal diameter, similar to the prior  examination in 2010. No evidence of aortic dissection as clinically questioned.  Moderate atherosclerosis.  2.  Scattered prominent to mildly enlarged mediastinal bilateral hilar lymph  nodes which are nonspecific but can be reactive.  3.  Scattered hepatic and bilateral renal cysts.  4.  Stable left adrenal adenoma.  5.  Enlarged prostate.  6.  Other findings as above.    I have personally reviewed pertinent reports.      Assessment:  Aortic Root Ascending Aortic Aneurysm    Plan:    Yogesh Guardado has aortic root measuring 49 mm in size at its greatest diameter. He will return to the office in 6 " months with a transthoracic echo and gated CTA chest.     He has been hypertensive systolic numbers in the 140s at home.  He has a BP cuff at home, recommended him to take his BP at home while at rest and record the numbers starting today.  I will message Dr. Franco for medication to control BP with aortic aneurysm.     Yogesh Guardado was comfortable with our recommendations, and their questions were answered to their satisfaction.  Thank you for allowing us to participate in the care of this patient.     Aortic Aneurysm Instructions were provided to the patient as follows:    1. No heavy sustained lifting which would require excessive straining  2. Maintain a controlled blood pressure with a goal of 120/80.  3. Follow up in A[facility] Clinic as recommended with radiology follow up as instructed  4. Report to the ER or call 911 immediately with the following signs / symptoms: sudden onset of back pain, chest pain or shortness of breath.    The patient recently had a screening colonoscopy in 2015.  Therefore GI referral is not indicated at this time.     SIGNATURE: Allison Schulz PA-C  DATE: 04/11/24  TIME: 1:36 PM    * This note was completed in part utilizing [employer] direct voice recognition software.   Grammatical errors, random word insertion, spelling mistakes, and incomplete sentences may be an occasional consequence of the system secondary to software limitations, ambient noise and hardware issues. At the time of dictation, efforts were made to edit, clarify and /or correct errors. Please read the chart carefully and recognize, using context, where substitutions have occurred.  If you have any questions or concerns about the context, text or information contained within the body of this dictation, please contact myself, the provider, for further clarification.   Attestation signed by John Horne MD at 4/11/2024  3:46 PM:  Attending Attestation:    I supervised the Advanced Practitioner.  I  discussed the case with the Advanced Practitioner, reviewed the note and agree.    The patient is a 68-year-old man referred for evaluation of an asymptomatic ascending aorta aneurysm.  He does not have significant comorbidities, he is not a smoker.  I have personally reviewed all his diagnostic images, CTA of the chest abdomen and pelvis on 9/16/2019 shows an ascending aorta with a maximum diameter 4.1 cm and aortic root 4.5 cm.  CT chest on 10/29/2021 shows ascending aorta 4.2 and aortic root 4.6 cm, his most recent CT on 3/16/2024 shows no changes on the ascending aorta measuring 4.2 cm but his aortic root has increased in size to 4.9 cm.  I explained the diagnosis to the patient and his wife, there is no indication for surgery at this point.  Since there was some change compared to his previous studies I would like to see him back in 6 months with a gated CTA of the chest to get a better assessment of the aortic root and a transthoracic echocardiogram.  We talked about the importance of maintaining adequate blood pressure control, avoid tobacco products, avoid sustained straining.  He was instructed to go to emergency department he were to experience severe chest pain or severe upper back pain.

## 2024-04-11 NOTE — PROGRESS NOTES
Consultation - Cardiothoracic Surgery   Yogesh Guardado 68 y.o. male MRN: [id number]    Physician Requesting Consult: Salvador    Reason for Consult / Principal Problem: Aortic aneurysm, ascending and root     History of Present Illness: Yogesh Guardado is a 68 y.o. year old male who presents today for surgical consultation for ascending aortic aneurysm.  Bill had an incidental finding from imaging in 2010.  He was getting outpatient imaging with his PCP every 2 - 3 years.  He had chest pain while riding his bike after doing vigorous yard work and had a workup at [facility] including CTA.  The workup did not result in cardiac causes and there was no determination of cause, thought to be of soreness of from physical labor. His aortic root size was larger to 49 mm and he is now referred to the office for evaluation.      He is here with his wife.  They live in [place], [place] and have an active lifestyle, they bike ride on the [place] they enjoy doing yard work outside and building outside.  He volunteers for [employer] for [employer].  He is a retired semi conductor manager.  He is a non smoker, he did grow up in a home with long term significant smokers in his parents.  He has about 1 drink a week and no illegal drug use.  He has no known family history of sudden death or aneurysms in his family.  His CTA scan showed a dilated pancreatic duct and had further work up and he is having EUS on 6/28 at [facility].     Past Medical History:  Past Medical History:   Diagnosis Date    Adrenal adenoma     Anxiety     Arthritis     Cardiac disorder     Cataracts, bilateral     COPD (chronic obstructive pulmonary disease) ([facility]) 08/2019    Hearing loss     HL (hearing loss)     Polyp of colon     Testicular cyst     Thoracic aortic aneurysm (HCC)     Visual impairment      Past Surgical History:   Past Surgical History:   Procedure Laterality Date    APPENDECTOMY      CATARACT EXTRACTION, BILATERAL      EYE SURGERY  jan 2019    HERNIA  REPAIR      2    SHOULDER SURGERY Left      Family History:  Family History   Problem Relation Age of Onset    Hypertension Mother     Hypertension Father     Throat cancer Father     Cancer Father     Cancer Sister     Coronary artery disease Brother      Social History:    Social History     Substance and Sexual Activity   Alcohol Use Yes    Alcohol/week: 1.0 standard drink of alcohol    Types: 1 Cans of beer per week    Comment: 1 beer a week     Social History     Substance and Sexual Activity   Drug Use Not Currently    Frequency: 1.0 times per week    Types: Marijuana    Comment:      Social History     Tobacco Use   Smoking Status Former    Current packs/day: 0.00    Types: Cigarettes    Quit date: 1973    Years since quittin.3   Smokeless Tobacco Never       Marital Status: [unfilled]    Home Medications:   Prior to Admission medications    Medication Sig Start Date End Date Taking? Authorizing Provider   cholecalciferol (VITAMIN D3) 1,000 units tablet Take 1,000 Units by mouth daily    Historical Provider, MD   Cyanocobalamin (Vitamin B 12) 500 MCG TABS Take 500 mcg by mouth daily    Historical Provider, MD   escitalopram (LEXAPRO) 10 mg tablet Take 1 tablet (10 mg total) by mouth daily 21   Lorrie Franco DO   meloxicam (Mobic) 7.5 mg tablet Take 1 tablet (7.5 mg total) by mouth daily  Patient not taking: Reported on 3/22/2024 12/6/23   Chalo White, DPM   Misc Natural Products (Osteo Bi-Flex Triple Strength) TABS Take by mouth daily 10/1/20   Historical Provider, MD   Multiple Vitamins-Minerals (MULTIVITAMIN ADULT PO) Take 1 tablet by mouth daily    Historical Provider, MD       Allergies:  No Known Allergies    Review of Systems:     Review of Systems   Constitutional: Negative.    HENT: Negative.     Eyes: Negative.    Respiratory: Negative.     Cardiovascular: Negative.    Gastrointestinal: Negative.    Endocrine: Negative.    Genitourinary: Negative.    Musculoskeletal:  "Negative.    Skin: Negative.    Allergic/Immunologic: Negative.    Neurological: Negative.    Hematological: Negative.    Psychiatric/Behavioral: Negative.         Vital Signs:     Vitals:    04/11/24 1253 04/11/24 1258   BP: 142/80 149/85   BP Location: Left arm Right arm   Patient Position: Sitting Sitting   Cuff Size: Standard Standard   Pulse: 78    SpO2: 97%    Weight: 83 kg (183 lb)    Height: 6' 1\" (1.854 m)        Physical Exam:     Physical Exam  Constitutional:       General: He is not in acute distress.     Appearance: Normal appearance. He is normal weight. He is not ill-appearing, toxic-appearing or diaphoretic.   HENT:      Head: Normocephalic and atraumatic.      Right Ear: External ear normal.      Left Ear: External ear normal.      Nose: Nose normal.      Mouth/Throat:      Mouth: Mucous membranes are moist.      Pharynx: Oropharynx is clear. No oropharyngeal exudate or posterior oropharyngeal erythema.   Eyes:      General:         Right eye: No discharge.         Left eye: No discharge.      Extraocular Movements: Extraocular movements intact.      Conjunctiva/sclera: Conjunctivae normal.      Pupils: Pupils are equal, round, and reactive to light.   Neck:      Vascular: No carotid bruit.   Cardiovascular:      Rate and Rhythm: Normal rate and regular rhythm.      Pulses: Normal pulses.      Heart sounds: No murmur heard.  Pulmonary:      Effort: Pulmonary effort is normal. No respiratory distress.      Breath sounds: Normal breath sounds. No wheezing or rales.   Abdominal:      General: Bowel sounds are normal. There is no distension.      Palpations: Abdomen is soft.      Tenderness: There is no abdominal tenderness. There is no guarding.   Musculoskeletal:      Cervical back: Normal range of motion and neck supple.      Right lower leg: No edema.      Left lower leg: No edema.   Skin:     General: Skin is warm and dry.      Coloration: Skin is not pale.      Findings: No erythema or rash. " "  Neurological:      General: No focal deficit present.      Mental Status: He is alert and oriented to person, place, and time.      Sensory: No sensory deficit.      Coordination: Coordination normal.   Psychiatric:         Mood and Affect: Mood normal.         Behavior: Behavior normal.         Thought Content: Thought content normal.         Judgment: Judgment normal.         Lab Results:               Invalid input(s): \"LABGLOM\"      No results found for: \"HGBA1C\"  Lab Results   Component Value Date    TROPONINI <0.02 08/26/2019       Imaging Studies:     CT Chest:   Impression    Impression:  1.  Aneurysmal dilatation of ascending thoracic aorta including the aortic root,  the latter which measures up to 4.8 cm in maximal diameter, similar to the prior  examination in 2010. No evidence of aortic dissection as clinically questioned.  Moderate atherosclerosis.  2.  Scattered prominent to mildly enlarged mediastinal bilateral hilar lymph  nodes which are nonspecific but can be reactive.  3.  Scattered hepatic and bilateral renal cysts.  4.  Stable left adrenal adenoma.  5.  Enlarged prostate.  6.  Other findings as above.    I have personally reviewed pertinent reports.      Assessment:  Aortic Root Ascending Aortic Aneurysm    Plan:    Yogesh Guardado has aortic root measuring 49 mm in size at its greatest diameter. He will return to the office in 6 months with a transthoracic echo and gated CTA chest.     He has been hypertensive systolic numbers in the 140s at home.  He has a BP cuff at home, recommended him to take his BP at home while at rest and record the numbers starting today.  I will message Dr. Franco for medication to control BP with aortic aneurysm.     Yogesh Guardado was comfortable with our recommendations, and their questions were answered to their satisfaction.  Thank you for allowing us to participate in the care of this patient.     Aortic Aneurysm Instructions were provided to the patient as " follows:    1. No heavy sustained lifting which would require excessive straining  2. Maintain a controlled blood pressure with a goal of 120/80.  3. Follow up in Aortic Clinic as recommended with radiology follow up as instructed  4. Report to the ER or call 911 immediately with the following signs / symptoms: sudden onset of back pain, chest pain or shortness of breath.    The patient recently had a screening colonoscopy in 2015.  Therefore GI referral is not indicated at this time.     SIGNATURE: Allison Schulz PA-C  DATE: 04/11/24  TIME: 1:36 PM    * This note was completed in part utilizing [email or web address] direct voice recognition software.   Grammatical errors, random word insertion, spelling mistakes, and incomplete sentences may be an occasional consequence of the system secondary to software limitations, ambient noise and hardware issues. At the time of dictation, efforts were made to edit, clarify and /or correct errors. Please read the chart carefully and recognize, using context, where substitutions have occurred.  If you have any questions or concerns about the context, text or information contained within the body of this dictation, please contact myself, the provider, for further clarification.

## 2024-06-21 DIAGNOSIS — M79.671 RIGHT FOOT PAIN: ICD-10-CM

## 2024-06-21 RX ORDER — MELOXICAM 7.5 MG/1
7.5 TABLET ORAL DAILY
Qty: 30 TABLET | Refills: 3 | Status: SHIPPED | OUTPATIENT
Start: 2024-06-21

## 2024-06-27 ENCOUNTER — ANESTHESIA (OUTPATIENT)
Dept: ANESTHESIOLOGY | Facility: HOSPITAL | Age: 69
End: 2024-06-27

## 2024-06-27 ENCOUNTER — ANESTHESIA EVENT (OUTPATIENT)
Dept: ANESTHESIOLOGY | Facility: HOSPITAL | Age: 69
End: 2024-06-27

## 2024-06-27 ENCOUNTER — ANESTHESIA EVENT (OUTPATIENT)
Dept: GASTROENTEROLOGY | Facility: HOSPITAL | Age: 69
End: 2024-06-27

## 2024-06-27 NOTE — ANESTHESIA PREPROCEDURE EVALUATION
Procedure:  PRE-OP ONLY    Relevant Problems   CARDIO   (+) Abdominal aortic aneurysm (AAA) without rupture (HCC)   (+) Other chest pain   (+) Thoracic aortic aneurysm without rupture (HCC)   (+) Thoracic ascending aortic aneurysm (HCC)      GI/HEPATIC   (+) IPMN (intraductal papillary mucinous neoplasm)      NEURO/PSYCH   (+) Anxiety      PULMONARY   (+) Centrilobular emphysema (HCC)        Physical Exam    Airway    Mallampati score: II  TM Distance: >3 FB  Neck ROM: full     Dental   No notable dental hx     Cardiovascular  Cardiovascular exam normal    Pulmonary  Pulmonary exam normal     Other Findings        Anesthesia Plan  ASA Score- 3     Anesthesia Type- IV sedation with anesthesia with ASA Monitors.         Additional Monitors:     Airway Plan:            Plan Factors-    Induction-     Postoperative Plan-         Informed Consent-

## 2024-06-28 ENCOUNTER — ANESTHESIA (OUTPATIENT)
Dept: GASTROENTEROLOGY | Facility: HOSPITAL | Age: 69
End: 2024-06-28

## 2024-06-28 ENCOUNTER — HOSPITAL ENCOUNTER (OUTPATIENT)
Dept: GASTROENTEROLOGY | Facility: HOSPITAL | Age: 69
Setting detail: OUTPATIENT SURGERY
End: 2024-06-28
Attending: INTERNAL MEDICINE
Payer: COMMERCIAL

## 2024-06-28 VITALS
BODY MASS INDEX: 24.25 KG/M2 | OXYGEN SATURATION: 99 % | TEMPERATURE: 97.6 F | DIASTOLIC BLOOD PRESSURE: 81 MMHG | WEIGHT: 183 LBS | RESPIRATION RATE: 16 BRPM | HEART RATE: 58 BPM | HEIGHT: 73 IN | SYSTOLIC BLOOD PRESSURE: 133 MMHG

## 2024-06-28 DIAGNOSIS — D49.0 IPMN (INTRADUCTAL PAPILLARY MUCINOUS NEOPLASM): ICD-10-CM

## 2024-06-28 DIAGNOSIS — K21.9 GASTROESOPHAGEAL REFLUX DISEASE WITHOUT ESOPHAGITIS: Primary | ICD-10-CM

## 2024-06-28 DIAGNOSIS — K86.2 PANCREAS CYST: ICD-10-CM

## 2024-06-28 PROCEDURE — 43259 EGD US EXAM DUODENUM/JEJUNUM: CPT | Performed by: INTERNAL MEDICINE

## 2024-06-28 PROCEDURE — 43239 EGD BIOPSY SINGLE/MULTIPLE: CPT | Performed by: INTERNAL MEDICINE

## 2024-06-28 PROCEDURE — 88305 TISSUE EXAM BY PATHOLOGIST: CPT | Performed by: PATHOLOGY

## 2024-06-28 PROCEDURE — 88342 IMHCHEM/IMCYTCHM 1ST ANTB: CPT | Performed by: PATHOLOGY

## 2024-06-28 PROCEDURE — 93005 ELECTROCARDIOGRAM TRACING: CPT

## 2024-06-28 RX ORDER — SODIUM CHLORIDE, SODIUM LACTATE, POTASSIUM CHLORIDE, CALCIUM CHLORIDE 600; 310; 30; 20 MG/100ML; MG/100ML; MG/100ML; MG/100ML
INJECTION, SOLUTION INTRAVENOUS CONTINUOUS PRN
Status: DISCONTINUED | OUTPATIENT
Start: 2024-06-28 | End: 2024-06-28

## 2024-06-28 RX ORDER — PROPOFOL 10 MG/ML
INJECTION, EMULSION INTRAVENOUS AS NEEDED
Status: DISCONTINUED | OUTPATIENT
Start: 2024-06-28 | End: 2024-06-28

## 2024-06-28 RX ORDER — LIDOCAINE HYDROCHLORIDE 20 MG/ML
INJECTION, SOLUTION EPIDURAL; INFILTRATION; INTRACAUDAL; PERINEURAL AS NEEDED
Status: DISCONTINUED | OUTPATIENT
Start: 2024-06-28 | End: 2024-06-28

## 2024-06-28 RX ORDER — CIPROFLOXACIN 2 MG/ML
400 INJECTION, SOLUTION INTRAVENOUS ONCE
Status: DISCONTINUED | OUTPATIENT
Start: 2024-06-28 | End: 2024-07-02 | Stop reason: HOSPADM

## 2024-06-28 RX ORDER — PANTOPRAZOLE SODIUM 40 MG/1
40 TABLET, DELAYED RELEASE ORAL DAILY
Qty: 30 TABLET | Refills: 3 | Status: SHIPPED | OUTPATIENT
Start: 2024-06-28 | End: 2024-07-09 | Stop reason: SDUPTHER

## 2024-06-28 RX ADMIN — PROPOFOL 30 MG: 10 INJECTION, EMULSION INTRAVENOUS at 11:40

## 2024-06-28 RX ADMIN — LIDOCAINE HYDROCHLORIDE 100 MG: 20 INJECTION, SOLUTION EPIDURAL; INFILTRATION; INTRACAUDAL at 11:22

## 2024-06-28 RX ADMIN — PROPOFOL 50 MG: 10 INJECTION, EMULSION INTRAVENOUS at 11:42

## 2024-06-28 RX ADMIN — PROPOFOL 30 MG: 10 INJECTION, EMULSION INTRAVENOUS at 11:28

## 2024-06-28 RX ADMIN — SODIUM CHLORIDE, SODIUM LACTATE, POTASSIUM CHLORIDE, AND CALCIUM CHLORIDE: .6; .31; .03; .02 INJECTION, SOLUTION INTRAVENOUS at 11:19

## 2024-06-28 RX ADMIN — PROPOFOL 100 MG: 10 INJECTION, EMULSION INTRAVENOUS at 11:22

## 2024-06-28 RX ADMIN — PROPOFOL 20 MG: 10 INJECTION, EMULSION INTRAVENOUS at 11:25

## 2024-06-28 RX ADMIN — PROPOFOL 30 MG: 10 INJECTION, EMULSION INTRAVENOUS at 11:37

## 2024-06-28 RX ADMIN — PROPOFOL 30 MG: 10 INJECTION, EMULSION INTRAVENOUS at 11:34

## 2024-06-28 RX ADMIN — PROPOFOL 30 MG: 10 INJECTION, EMULSION INTRAVENOUS at 11:31

## 2024-06-28 RX ADMIN — PROPOFOL 30 MG: 10 INJECTION, EMULSION INTRAVENOUS at 11:23

## 2024-06-28 NOTE — ANESTHESIA POSTPROCEDURE EVALUATION
Post-Op Assessment Note    CV Status:  Stable  Pain Score: 0    Pain management: adequate       Mental Status:  Alert and awake   Hydration Status:  Euvolemic   PONV Controlled:  Controlled   Airway Patency:  Patent and adequate  Two or more mitigation strategies used for obstructive sleep apnea   Post Op Vitals Reviewed: Yes    No anethesia notable event occurred.    Staff: CRNA               BP   90/61    Temp      Pulse  52   Resp   20   SpO2   97

## 2024-06-28 NOTE — H&P
"History and Physical -  [facility]  Yogesh Guardado 68 y.o. male MRN: [id number]    HPI: Yogesh Guardado is a 68 y.o. year old male who presents with dilated pancreatic duct      Review of Systems    Historical Information   Past Medical History:   Diagnosis Date    Adrenal adenoma     Anxiety     Arthritis     Cardiac disorder     Cataracts, bilateral     COPD (chronic obstructive pulmonary disease) (HCC) 2019    Hearing loss     HL (hearing loss)     Polyp of colon     Testicular cyst     Thoracic aortic aneurysm (HCC)     Visual impairment      Past Surgical History:   Procedure Laterality Date    APPENDECTOMY      CATARACT EXTRACTION, BILATERAL      EYE SURGERY  2019    HERNIA REPAIR      2    SHOULDER SURGERY Left      Social History   Social History     Substance and Sexual Activity   Alcohol Use Yes    Alcohol/week: 1.0 standard drink of alcohol    Types: 1 Cans of beer per week    Comment: 1 beer a week     Social History     Substance and Sexual Activity   Drug Use Not Currently    Frequency: 1.0 times per week    Types: Marijuana    Comment:      Social History     Tobacco Use   Smoking Status Former    Current packs/day: 0.00    Types: Cigarettes    Quit date: 1973    Years since quittin.5   Smokeless Tobacco Never     Family History   Problem Relation Age of Onset    Hypertension Mother     Hypertension Father     Throat cancer Father     Cancer Father     Cancer Sister     Coronary artery disease Brother        Meds/Allergies     Not in a hospital admission.    No Known Allergies    Objective     /80   Pulse 59   Temp (!) 96.9 °F (36.1 °C) (Temporal)   Resp 18   Ht 6' 1\" (1.854 m)   Wt 83 kg (183 lb)   SpO2 98%   BMI 24.14 kg/m²       PHYSICAL EXAM    Gen: NAD  CV: RRR  CHEST: Clear  ABD: soft, NT/ND  EXT: no edema  Neuro: AAO      ASSESSMENT/PLAN:  This is a 68 y.o. year old male here for dilated pancreatic duct    PLAN:   Procedure: egd/eus      "

## 2024-06-29 LAB
ATRIAL RATE: 64 BPM
P AXIS: 78 DEGREES
PR INTERVAL: 184 MS
QRS AXIS: 76 DEGREES
QRSD INTERVAL: 96 MS
QT INTERVAL: 408 MS
QTC INTERVAL: 411 MS
T WAVE AXIS: 79 DEGREES
VENTRICULAR RATE: 61 BPM

## 2024-06-29 PROCEDURE — 93010 ELECTROCARDIOGRAM REPORT: CPT | Performed by: INTERNAL MEDICINE

## 2024-07-02 PROCEDURE — 88305 TISSUE EXAM BY PATHOLOGIST: CPT | Performed by: PATHOLOGY

## 2024-07-02 PROCEDURE — 88342 IMHCHEM/IMCYTCHM 1ST ANTB: CPT | Performed by: PATHOLOGY

## 2024-07-05 DIAGNOSIS — A04.8 H. PYLORI INFECTION: Primary | ICD-10-CM

## 2024-07-05 RX ORDER — TETRACYCLINE HYDROCHLORIDE 500 MG/1
500 CAPSULE ORAL 4 TIMES DAILY
Qty: 56 CAPSULE | Refills: 0 | Status: SHIPPED | OUTPATIENT
Start: 2024-07-05 | End: 2024-07-19

## 2024-07-05 RX ORDER — METRONIDAZOLE 250 MG/1
250 TABLET ORAL 4 TIMES DAILY
Qty: 56 TABLET | Refills: 0 | Status: SHIPPED | OUTPATIENT
Start: 2024-07-05 | End: 2024-07-19

## 2024-07-05 RX ORDER — LANSOPRAZOLE 30 MG/1
30 CAPSULE, DELAYED RELEASE ORAL 2 TIMES DAILY
Qty: 28 CAPSULE | Refills: 0 | Status: SHIPPED | OUTPATIENT
Start: 2024-07-05

## 2024-07-05 RX ORDER — BISMUTH SUBSALICYLATE 262 MG/1
524 TABLET, CHEWABLE ORAL
Qty: 112 TABLET | Refills: 0 | Status: SHIPPED | OUTPATIENT
Start: 2024-07-05 | End: 2024-07-19

## 2024-07-08 ENCOUNTER — TELEPHONE (OUTPATIENT)
Dept: GASTROENTEROLOGY | Facility: CLINIC | Age: 69
End: 2024-07-08

## 2024-07-08 DIAGNOSIS — A04.8 H. PYLORI INFECTION: ICD-10-CM

## 2024-07-08 DIAGNOSIS — K21.9 GASTROESOPHAGEAL REFLUX DISEASE WITHOUT ESOPHAGITIS: ICD-10-CM

## 2024-07-08 RX ORDER — METRONIDAZOLE 250 MG/1
250 TABLET ORAL 4 TIMES DAILY
Qty: 56 TABLET | Refills: 0 | Status: SHIPPED | OUTPATIENT
Start: 2024-07-08 | End: 2024-07-22

## 2024-07-08 RX ORDER — LANSOPRAZOLE 30 MG/1
30 CAPSULE, DELAYED RELEASE ORAL 2 TIMES DAILY
Qty: 28 CAPSULE | Refills: 0 | Status: SHIPPED | OUTPATIENT
Start: 2024-07-08 | End: 2024-07-09

## 2024-07-08 RX ORDER — TETRACYCLINE HYDROCHLORIDE 500 MG/1
500 CAPSULE ORAL 4 TIMES DAILY
Qty: 56 CAPSULE | Refills: 0 | Status: SHIPPED | OUTPATIENT
Start: 2024-07-08 | End: 2024-07-22

## 2024-07-08 NOTE — TELEPHONE ENCOUNTER
Lmom asking patient to contact the office to confirm there weren't any questions or concerns along with making sure the medication was picked up.    Patient reviewed results via [facility]   Appt recall placed

## 2024-07-08 NOTE — TELEPHONE ENCOUNTER
----- Message from Brodie Welch MD sent at 7/5/2024  3:49 PM EDT -----  The biopsies from your recent upper endoscopy confirmed H. pylori infection.  This may explain some of the symptoms and inflammation that we saw during your recent upper endoscopy.    I sent in a prescription to take an antibiotic regimen for the next 2 weeks, please make sure you complete the entire course.    Follow-up in the office in 3 to 4 months.  At that time we will repeat stool studies to confirm eradication.    Please call with any questions or concerns.

## 2024-07-08 NOTE — TELEPHONE ENCOUNTER
Pt reports [facility] is unable to get generic Tetracycline. Pt requests all scripts for H.Pylori treatment to be sent to:    [id number] [place] [address] [phone]    Pt will purchase Pepto OTC. Discussed potential side effects of antibiotic treatment. Pt will hold pantoprazole and instead use lansoprazole 30 mg BID for 14 days.

## 2024-07-09 DIAGNOSIS — K21.9 GASTROESOPHAGEAL REFLUX DISEASE WITHOUT ESOPHAGITIS: ICD-10-CM

## 2024-07-09 RX ORDER — PANTOPRAZOLE SODIUM 40 MG/1
TABLET, DELAYED RELEASE ORAL
Qty: 104 TABLET | Refills: 0 | Status: SHIPPED | OUTPATIENT
Start: 2024-07-09 | End: 2024-10-07

## 2024-09-30 ENCOUNTER — OFFICE VISIT (OUTPATIENT)
Dept: PODIATRY | Facility: CLINIC | Age: 69
End: 2024-09-30
Payer: COMMERCIAL

## 2024-09-30 VITALS
HEIGHT: 73 IN | DIASTOLIC BLOOD PRESSURE: 79 MMHG | WEIGHT: 180 LBS | SYSTOLIC BLOOD PRESSURE: 134 MMHG | HEART RATE: 69 BPM | OXYGEN SATURATION: 96 % | BODY MASS INDEX: 23.86 KG/M2

## 2024-09-30 DIAGNOSIS — M20.5X1 HALLUX LIMITUS OF RIGHT FOOT: ICD-10-CM

## 2024-09-30 DIAGNOSIS — M25.871 SESAMOIDITIS OF RIGHT FOOT: ICD-10-CM

## 2024-09-30 DIAGNOSIS — M19.071 DJD (DEGENERATIVE JOINT DISEASE), ANKLE AND FOOT, RIGHT: Primary | ICD-10-CM

## 2024-09-30 DIAGNOSIS — F41.9 ANXIETY: ICD-10-CM

## 2024-09-30 DIAGNOSIS — M79.671 RIGHT FOOT PAIN: ICD-10-CM

## 2024-09-30 DIAGNOSIS — I10 ESSENTIAL HYPERTENSION: ICD-10-CM

## 2024-09-30 PROCEDURE — 20600 DRAIN/INJ JOINT/BURSA W/O US: CPT | Performed by: PODIATRIST

## 2024-09-30 PROCEDURE — 99213 OFFICE O/P EST LOW 20 MIN: CPT | Performed by: PODIATRIST

## 2024-09-30 RX ORDER — LIDOCAINE HYDROCHLORIDE 10 MG/ML
1 INJECTION, SOLUTION INFILTRATION; PERINEURAL
Status: SHIPPED | OUTPATIENT
Start: 2024-09-30

## 2024-09-30 RX ORDER — TRIAMCINOLONE ACETONIDE 40 MG/ML
40 INJECTION, SUSPENSION INTRA-ARTICULAR; INTRAMUSCULAR
Status: SHIPPED | OUTPATIENT
Start: 2024-09-30

## 2024-09-30 RX ORDER — BUPIVACAINE HYDROCHLORIDE 2.5 MG/ML
1 INJECTION, SOLUTION EPIDURAL; INFILTRATION; INTRACAUDAL
Status: SHIPPED | OUTPATIENT
Start: 2024-09-30

## 2024-09-30 RX ORDER — LISINOPRIL 10 MG/1
10 TABLET ORAL DAILY
Qty: 90 TABLET | Refills: 0 | Status: SHIPPED | OUTPATIENT
Start: 2024-09-30

## 2024-09-30 RX ORDER — MELOXICAM 7.5 MG/1
7.5 TABLET ORAL DAILY
Qty: 30 TABLET | Refills: 3 | Status: SHIPPED | OUTPATIENT
Start: 2024-09-30

## 2024-09-30 RX ORDER — ESCITALOPRAM OXALATE 10 MG/1
10 TABLET ORAL DAILY
Qty: 90 TABLET | Refills: 0 | Status: SHIPPED | OUTPATIENT
Start: 2024-09-30

## 2024-09-30 RX ADMIN — BUPIVACAINE HYDROCHLORIDE 1 ML: 2.5 INJECTION, SOLUTION EPIDURAL; INFILTRATION; INTRACAUDAL at 09:15

## 2024-09-30 RX ADMIN — LIDOCAINE HYDROCHLORIDE 1 ML: 10 INJECTION, SOLUTION INFILTRATION; PERINEURAL at 09:15

## 2024-09-30 RX ADMIN — TRIAMCINOLONE ACETONIDE 40 MG: 40 INJECTION, SUSPENSION INTRA-ARTICULAR; INTRAMUSCULAR at 09:15

## 2024-09-30 NOTE — PROGRESS NOTES
Assessment/Plan:     The patient's clinical examination today is consistent with mild tenderness isolated to the sesamoid apparatus on the right first MTPJ.  Osseous spurring is also noted to the dorsal aspect of the right first MTPJ with mild tenderness with palpation and passive range of motion.. There is no erythema nor edema nor calor noted to the patient's right midfoot.  Pedal pulses on the right foot are palpable.     The patient was amenable to repeat therapy of his arthritic forefoot joint as well as the tibial sesamoid today.  After prepping the skin with Betadine and alcohol and a cortisone injection was administered to the dorsal right first MTPJ and plantar right first MTPJ in the area of the tibial sesamoid of the right foot.  That injection was well-tolerated as well.     Continue meloxicam on as-needed basis.  A refill was sent to his pharmacy.  Continue supportive shoe gear and OTC arch supports.       Diagnoses and all orders for this visit:    DJD (degenerative joint disease), ankle and foot, right    Right foot pain  -     meloxicam (Mobic) 7.5 mg tablet; Take 1 tablet (7.5 mg total) by mouth daily    Sesamoiditis of right foot          Subjective:     Patient ID: Yogesh Guardado is a 69 y.o. male.    The patient resents today for follow-up of recurrent right forefoot pain secondary to osteoarthritis.  He did receive an injection back in March worked very well.  He noted over 4 months of relief from that last injection.  He has to start to notice some recurrence of pain.  He does still take meloxicam every once a while and as needed basis and feels that his does help when he takes it.      PAST MEDICAL HISTORY:  Past Medical History:   Diagnosis Date    Adrenal adenoma     Anxiety     Arthritis     Cardiac disorder     Cataracts, bilateral     COPD (chronic obstructive pulmonary disease) ([facility]) 08/2019    Hearing loss     HL (hearing loss)     Polyp of colon     Testicular cyst     Thoracic aortic  aneurysm (HCC)     Visual impairment        PAST SURGICAL HISTORY:  Past Surgical History:   Procedure Laterality Date    APPENDECTOMY      CATARACT EXTRACTION, BILATERAL      EYE SURGERY  jan 2019    HERNIA REPAIR      2    SHOULDER SURGERY Left         ALLERGIES:  Patient has no known allergies.    MEDICATIONS:  Current Outpatient Medications   Medication Sig Dispense Refill    cholecalciferol (VITAMIN D3) 1,000 units tablet Take 1,000 Units by mouth daily      Cyanocobalamin (Vitamin B 12) 500 MCG TABS Take 500 mcg by mouth daily      escitalopram (LEXAPRO) 10 mg tablet Take 1 tablet (10 mg total) by mouth daily 90 tablet 1    lisinopril (ZESTRIL) 10 mg tablet Take 1 tablet (10 mg total) by mouth daily 90 tablet 1    meloxicam (Mobic) 7.5 mg tablet Take 1 tablet (7.5 mg total) by mouth daily 30 tablet 3    Misc Natural Products (Osteo Bi-Flex Triple Strength) TABS Take by mouth daily      Multiple Vitamins-Minerals (MULTIVITAMIN ADULT PO) Take 1 tablet by mouth daily      pantoprazole (PROTONIX) 40 mg tablet Take 1 tablet (40 mg total) by mouth 2 (two) times a day for 14 days, THEN 1 tablet (40 mg total) daily. 104 tablet 0     Current Facility-Administered Medications   Medication Dose Route Frequency Provider Last Rate Last Admin    bupivacaine (MARCAINE) 0.25 % injection 1 mL  1 mL Infiltration     1 mL at 03/26/24 0915    bupivacaine (MARCAINE) 0.5 % injection 1 mL  1 mL      1 mL at 03/26/24 0915    bupivacaine (PF) (MARCAINE) 0.25 % injection 1 mL  1 mL Intra-articular  [facility] White, DPM   1 mL at 12/09/22 1454    bupivacaine (PF) (MARCAINE) 0.25 % injection 1 mL  1 mL Intra-articular  Chalo [facility] White, DPM   1 mL at 03/30/23 1647    bupivacaine (PF) (MARCAINE) 0.25 % injection 1 mL  1 mL Intra-articular  Chalo [facility] White, DPM   1 mL at 08/21/23 0915    methylPREDNISolone acetate (DEPO-MEDROL) injection 0.5 mL  0.5 mL Intra-articular  [facility] White, DPM   0.5 mL at 03/30/23 1647    triamcinolone  acetonide (KENALOG-40) 40 mg/mL injection 20 mg  20 mg Intra-articular  Chalo [facility] White, DPM   20 mg at 22 1454    triamcinolone acetonide (KENALOG-40) 40 mg/mL injection 20 mg  20 mg Intra-articular  Chalo [facility] White, DPM   20 mg at 23 0915    triamcinolone acetonide (KENALOG-40) 40 mg/mL injection 20 mg  20 mg Infiltration     20 mg at 24 0915    triamcinolone acetonide (KENALOG-40) 40 mg/mL injection 20 mg  20 mg Intra-articular     20 mg at 24 0915       SOCIAL HISTORY:  Social History     Socioeconomic History    Marital status: /Civil Union     Spouse name: None    Number of children: None    Years of education: None    Highest education level: None   Occupational History    None   Tobacco Use    Smoking status: Former     Current packs/day: 0.00     Types: Cigarettes     Quit date: 1973     Years since quittin.7    Smokeless tobacco: Never   Vaping Use    Vaping status: Never Used   Substance and Sexual Activity    Alcohol use: Yes     Alcohol/week: 1.0 standard drink of alcohol     Types: 1 Cans of beer per week     Comment: 1 beer a week    Drug use: Not Currently     Frequency: 1.0 times per week     Types: Marijuana     Comment:     Sexual activity: Yes     Partners: Female   Other Topics Concern    None   Social History Narrative    None     Social Determinants of Health     Financial Resource Strain: Low Risk  (2024)    Overall Financial Resource Strain (CARDIA)     Difficulty of Paying Living Expenses: Not very hard   Food Insecurity: Not on file   Transportation Needs: No Transportation Needs (2024)    PRAPARE - Transportation     Lack of Transportation (Medical): No     Lack of Transportation (Non-Medical): No   Physical Activity: Not on file   Stress: Not on file   Social Connections: Unknown (2024)    Received from light    Social [employer]     How often do you feel lonely or isolated from those around you? (Adult - for ages 18 years  and over): Not on file   Intimate Partner Violence: Not on file   Housing Stability: Not on file        Review of Systems   Constitutional: Negative.    HENT: Negative.     Eyes: Negative.    Respiratory: Negative.     Cardiovascular: Negative.    Endocrine: Negative.    Musculoskeletal: Negative.    Neurological: Negative.    Hematological: Negative.    Psychiatric/Behavioral: Negative.           Objective:     Physical Exam  Vitals reviewed.   Constitutional:       Appearance: Normal appearance.   HENT:      Head: Normocephalic and atraumatic.      Nose: Nose normal.   Eyes:      Conjunctiva/sclera: Conjunctivae normal.      Pupils: Pupils are equal, round, and reactive to light.   Cardiovascular:      Pulses:           Dorsalis pedis pulses are 2+ on the right side.        Posterior tibial pulses are 2+ on the right side.   Pulmonary:      Effort: Pulmonary effort is normal.   Musculoskeletal:      Right foot: Decreased range of motion.        Feet:    Feet:      Right foot:      Skin integrity: Skin integrity normal.      Comments: The patient's clinical examination today is consistent with mild tenderness isolated to the sesamoid apparatus on the right first MTPJ.  Osseous spurring is also noted to the dorsal aspect of the right first MTPJ with mild tenderness with palpation and passive range of motion.. There is no erythema nor edema nor calor noted to the patient's right midfoot.  Pedal pulses on the right foot are palpable.  Skin:     General: Skin is warm.      Capillary Refill: Capillary refill takes less than 2 seconds.   Neurological:      General: No focal deficit present.      Mental Status: He is alert and oriented to person, place, and time.   Psychiatric:         Mood and Affect: Mood normal.         Behavior: Behavior normal.         Thought Content: Thought content normal.         Small joint arthrocentesis: R great MTP  Universal Protocol:  procedure performed by consultantConsent: Verbal consent  "obtained.  Risks and benefits: risks, benefits and alternatives were discussed  Consent given by: patient  Time out: Immediately prior to procedure a \"time out\" was called to verify the correct patient, procedure, equipment, support staff and site/side marked as required.  Timeout called at: 9/30/2024 9:40 AM.  Patient understanding: patient states understanding of the procedure being performed  Patient identity confirmed: verbally with patient and provided demographic data  Supporting Documentation  Indications: pain   Procedure Details  Location: great toe - R great MTP  Preparation: Patient was prepped and draped in the usual sterile fashion  Needle size: 25 G  Ultrasound guidance: no  Approach: dorsal  Medications administered: 1 mL bupivacaine (PF) 0.25 %; 1 mL lidocaine 1 %; 40 mg triamcinolone acetonide 40 mg/mL    Patient tolerance: patient tolerated the procedure well with no immediate complications          "

## 2024-10-03 ENCOUNTER — TELEPHONE (OUTPATIENT)
Dept: RADIOLOGY | Facility: HOSPITAL | Age: 69
End: 2024-10-03

## 2024-10-03 ENCOUNTER — APPOINTMENT (OUTPATIENT)
Dept: LAB | Age: 69
End: 2024-10-03
Payer: COMMERCIAL

## 2024-10-03 DIAGNOSIS — Q25.43 AORTIC ROOT ANEURYSM: ICD-10-CM

## 2024-10-03 DIAGNOSIS — I71.21 ANEURYSM OF ASCENDING AORTA WITHOUT RUPTURE (HCC): ICD-10-CM

## 2024-10-03 LAB
BUN SERPL-MCNC: 22 MG/DL (ref 5–25)
CREAT SERPL-MCNC: 0.72 MG/DL (ref 0.6–1.3)
GFR SERPL CREATININE-BSD FRML MDRD: 93 ML/MIN/1.73SQ M

## 2024-10-03 PROCEDURE — 36415 COLL VENOUS BLD VENIPUNCTURE: CPT

## 2024-10-03 PROCEDURE — 82565 ASSAY OF CREATININE: CPT

## 2024-10-03 PROCEDURE — 84520 ASSAY OF UREA NITROGEN: CPT

## 2024-10-03 NOTE — TELEPHONE ENCOUNTER
Called and spoke with Cuco, reminded him to get his labs drawn prior to his appointment. He will go get them done a few days prior to appointment

## 2024-10-07 ENCOUNTER — HOSPITAL ENCOUNTER (OUTPATIENT)
Dept: NON INVASIVE DIAGNOSTICS | Facility: HOSPITAL | Age: 69
Discharge: HOME | End: 2024-10-07
Payer: COMMERCIAL

## 2024-10-07 ENCOUNTER — HOSPITAL ENCOUNTER (OUTPATIENT)
Dept: RADIOLOGY | Facility: HOSPITAL | Age: 69
Discharge: HOME | End: 2024-10-07
Payer: COMMERCIAL

## 2024-10-07 VITALS
HEIGHT: 73 IN | DIASTOLIC BLOOD PRESSURE: 79 MMHG | BODY MASS INDEX: 23.86 KG/M2 | WEIGHT: 180 LBS | SYSTOLIC BLOOD PRESSURE: 134 MMHG | HEART RATE: 69 BPM

## 2024-10-07 DIAGNOSIS — Q25.43 AORTIC ROOT ANEURYSM: ICD-10-CM

## 2024-10-07 DIAGNOSIS — I71.21 ANEURYSM OF ASCENDING AORTA WITHOUT RUPTURE (HCC): ICD-10-CM

## 2024-10-07 LAB
AORTIC ROOT: 5 CM
APICAL FOUR CHAMBER EJECTION FRACTION: 60 %
ASCENDING AORTA: 4.2 CM
BSA FOR ECHO PROCEDURE: 2.06 M2
E WAVE DECELERATION TIME: 292 MS
E/A RATIO: 1.06
FRACTIONAL SHORTENING: 32 (ref 28–44)
INTERVENTRICULAR SEPTUM IN DIASTOLE (PARASTERNAL SHORT AXIS VIEW): 1.1 CM
INTERVENTRICULAR SEPTUM: 1.1 CM (ref 0.6–1.1)
LA/AORTA RATIO 2D: 0.7
LAAS-AP2: 23.8 CM2
LAAS-AP4: 18.1 CM2
LEFT ATRIUM SIZE: 3.5 CM
LEFT INTERNAL DIMENSION IN SYSTOLE: 3 CM (ref 2.1–4)
LEFT VENTRICULAR INTERNAL DIMENSION IN DIASTOLE: 4.4 CM (ref 3.5–6)
LEFT VENTRICULAR POSTERIOR WALL IN END DIASTOLE: 1 CM
LEFT VENTRICULAR STROKE VOLUME: 55 ML
LVSV (TEICH): 55 ML
MV E'TISSUE VEL-SEP: 8 CM/S
MV PEAK A VEL: 0.84 M/S
MV PEAK E VEL: 89 CM/S
MV STENOSIS PRESSURE HALF TIME: 85 MS
MV VALVE AREA P 1/2 METHOD: 2.59
RIGHT VENTRICLE ID DIMENSION: 3.9 CM
SL CV LV EF: 60
SL CV PED ECHO LEFT VENTRICLE DIASTOLIC VOLUME (MOD BIPLANE) 2D: 89 ML
SL CV PED ECHO LEFT VENTRICLE SYSTOLIC VOLUME (MOD BIPLANE) 2D: 34 ML
TRICUSPID ANNULAR PLANE SYSTOLIC EXCURSION: 2.2 CM

## 2024-10-07 PROCEDURE — 93306 TTE W/DOPPLER COMPLETE: CPT | Performed by: INTERNAL MEDICINE

## 2024-10-07 PROCEDURE — 71275 CT ANGIOGRAPHY CHEST: CPT

## 2024-10-07 PROCEDURE — 93306 TTE W/DOPPLER COMPLETE: CPT

## 2024-10-07 RX ADMIN — IOHEXOL 50 ML: 350 INJECTION, SOLUTION INTRAVENOUS at 09:37

## 2024-10-17 ENCOUNTER — OFFICE VISIT (OUTPATIENT)
Dept: CARDIAC SURGERY | Facility: CLINIC | Age: 69
End: 2024-10-17
Payer: COMMERCIAL

## 2024-10-17 VITALS
BODY MASS INDEX: 23.29 KG/M2 | DIASTOLIC BLOOD PRESSURE: 69 MMHG | OXYGEN SATURATION: 98 % | WEIGHT: 175.7 LBS | HEART RATE: 82 BPM | SYSTOLIC BLOOD PRESSURE: 109 MMHG | HEIGHT: 73 IN

## 2024-10-17 DIAGNOSIS — J43.2 CENTRILOBULAR EMPHYSEMA (HCC): ICD-10-CM

## 2024-10-17 DIAGNOSIS — Z13.6 ENCOUNTER FOR SCREENING FOR STENOSIS OF CAROTID ARTERY: ICD-10-CM

## 2024-10-17 DIAGNOSIS — I71.21 ANEURYSM OF ASCENDING AORTA WITHOUT RUPTURE (HCC): Primary | ICD-10-CM

## 2024-10-17 DIAGNOSIS — F41.9 ANXIETY: ICD-10-CM

## 2024-10-17 DIAGNOSIS — Q25.43 AORTIC ROOT ANEURYSM: ICD-10-CM

## 2024-10-17 DIAGNOSIS — D35.00 ADRENAL ADENOMA, UNSPECIFIED LATERALITY: ICD-10-CM

## 2024-10-17 DIAGNOSIS — R09.89 OTHER SPECIFIED SYMPTOMS AND SIGNS INVOLVING THE CIRCULATORY AND RESPIRATORY SYSTEMS: ICD-10-CM

## 2024-10-17 PROCEDURE — 99215 OFFICE O/P EST HI 40 MIN: CPT | Performed by: THORACIC SURGERY (CARDIOTHORACIC VASCULAR SURGERY)

## 2024-10-17 NOTE — PROGRESS NOTES
Aortic Surveillance - Cardiac Surgery   Yogesh Guardado 69 y.o. male MRN: [id number]    History of Present Illness: Yogesh Guardado is a 69 y.o. year old male who presents today for ongoing surveillance of previously identified ascending aortic aneurysm.  They were most recently evaluated in our office with CT imaging 4/11/24. CTA CAP at that time showed ascending aorta measuring 4.2cm and aortic root measuring 4.9cm in greatest diameter.    Upon radiologic examination today in reviewing with Dr Wynne, CTA from 10/7/24 shows the ascending aorta measures 4.2cm and aortic root measures ~5.1cm Recent ECHO on 10/7/24 shows EF 60%, trace AI, mild MR, aortic root measures ~5.2-5.3cm He admits to abiding by his lifting & exertion restrictions since his last visit. He is active & reports walking daily. He denies chest pain, palpitations, SOB, MATSON, LE edema b/l, back pain, arm pain, numbness/tingling/paresthesias in UE b/l, HA, lightheadedness, dizziness, presyncopal symptoms, or syncopal events today or since his last visit. Changes to his medical history since his last visit consist of EUS in 6/2024 for findings of a dilated pancreatic duct. HE was found to have H. Pylori and treated appropriately. BP is usually well controlled on Lisinopril and today BP is 109/69.     In review he has PMH of Ascending aortic aneurysm and aortic root aneurysm, anxiety, recently treated H. Pylori infection, intraductal papillary mucinous neoplasm (EUS 6/2024, follows with GI), pancreatic cyst, emphysema, h/o colon polyps, Afognak (wears hearing aids)    He is here with his wife. They live in [place], [place] and have an active lifestyle, they bike ride on the [place] they enjoy doing yard work outside and building outside. He volunteers for [employer] building [employer] He is a retired semi conductor manager. He is a non smoker, he did grow up in a home with long term significant smokers in his parents. He has about 1 drink a week and no illegal  drug use. He has no known family history of sudden death or aneurysms in his family.     Past Medical History:  Past Medical History:   Diagnosis Date    Adrenal adenoma     Anxiety     Arthritis     Cardiac disorder     Cataracts, bilateral     COPD (chronic obstructive pulmonary disease) (HCC) 2019    Hearing loss     HL (hearing loss)     Polyp of colon     Testicular cyst     Thoracic aortic aneurysm (HCC)     Visual impairment          Past Surgical History:   Past Surgical History:   Procedure Laterality Date    APPENDECTOMY      CATARACT EXTRACTION, BILATERAL      EYE SURGERY  2019    HERNIA REPAIR      2    SHOULDER SURGERY Left          Family History:  Family History   Problem Relation Age of Onset    Hypertension Mother     Hypertension Father     Throat cancer Father     Cancer Father     Cancer Sister     Coronary artery disease Brother          Social History:    Social History     Substance and Sexual Activity   Alcohol Use Yes    Alcohol/week: 1.0 standard drink of alcohol    Types: 1 Cans of beer per week    Comment: 1 beer a week     Social History     Substance and Sexual Activity   Drug Use Not Currently    Frequency: 1.0 times per week    Types: Marijuana    Comment:      Social History     Tobacco Use   Smoking Status Former    Current packs/day: 0.00    Types: Cigarettes    Quit date: 1973    Years since quittin.8   Smokeless Tobacco Never       Home Medications:   Prior to Admission medications    Medication Sig Start Date End Date Taking? Authorizing Provider   cholecalciferol (VITAMIN D3) 1,000 units tablet Take 1,000 Units by mouth daily    Historical Provider, MD   Cyanocobalamin (Vitamin B 12) 500 MCG TABS Take 500 mcg by mouth daily    Historical Provider, MD   escitalopram (LEXAPRO) 10 mg tablet Take 1 tablet (10 mg total) by mouth daily 24   Lorrie Franco DO   lisinopril (ZESTRIL) 10 mg tablet Take 1 tablet (10 mg total) by mouth daily 24   Lorrie Franco  DO   meloxicam (Mobic) 7.5 mg tablet Take 1 tablet (7.5 mg total) by mouth daily 9/30/24   Chalo Huber White, DPM   Misc Natural Products (Osteo Bi-Flex Triple Strength) TABS Take by mouth daily 10/1/20   Historical Provider, MD   Multiple Vitamins-Minerals (MULTIVITAMIN ADULT PO) Take 1 tablet by mouth daily    Historical Provider, MD   pantoprazole (PROTONIX) 40 mg tablet Take 1 tablet (40 mg total) by mouth 2 (two) times a day for 14 days, THEN 1 tablet (40 mg total) daily. 7/9/24 10/7/24  Earl Hutchins PA-C       Allergies:  No Known Allergies    Review of Systems:     Review of Systems   Constitutional:  Negative for chills, fatigue and fever.   HENT:  Negative for ear pain and sore throat.    Eyes:  Negative for pain and visual disturbance.   Respiratory:  Negative for cough and shortness of breath.    Cardiovascular:  Negative for chest pain, palpitations and leg swelling.   Gastrointestinal:  Negative for abdominal pain, constipation, diarrhea, nausea and vomiting.   Genitourinary:  Negative for dysuria and hematuria.   Musculoskeletal:  Negative for arthralgias and back pain.   Skin:  Negative for color change and rash.   Neurological:  Negative for seizures and syncope.   All other systems reviewed and are negative.      Vital Signs:     There were no vitals filed for this visit.    Physical Exam:     Physical Exam  Vitals reviewed.   Constitutional:       General: He is not in acute distress.     Appearance: Normal appearance. He is normal weight.   HENT:      Head: Normocephalic and atraumatic.      Mouth/Throat:      Mouth: Mucous membranes are moist.      Pharynx: Oropharynx is clear.   Eyes:      Extraocular Movements: Extraocular movements intact.      Conjunctiva/sclera: Conjunctivae normal.      Pupils: Pupils are equal, round, and reactive to light.   Cardiovascular:      Rate and Rhythm: Normal rate and regular rhythm.      Heart sounds: Normal heart sounds. No murmur heard.  Pulmonary:       "Effort: Pulmonary effort is normal.      Breath sounds: Normal breath sounds.   Abdominal:      General: Abdomen is flat. Bowel sounds are normal.      Palpations: Abdomen is soft.   Musculoskeletal:         General: No swelling. Normal range of motion.      Cervical back: Normal range of motion and neck supple.   Skin:     General: Skin is warm and dry.   Neurological:      General: No focal deficit present.      Mental Status: He is alert and oriented to person, place, and time.   Psychiatric:         Mood and Affect: Mood normal.         Behavior: Behavior normal.         Lab Results:               Invalid input(s): \"LABGLOM\"      No results found for: \"HGBA1C\"  Lab Results   Component Value Date    TROPONINI <0.02 08/26/2019       Imaging Studies:     CT Chest:   FINDINGS:  THORACIC VASCULAR STRUCTURES:  HEART:  The study is not coronary gated, limiting evaluation.  Within these confines, the heart is normal in size.  There is no pericardial effusion.  Coronary arterial calcifications are mild.  PULMONARY ARTERIES:  The main pulmonary artery is normal in size.  Nontargeted evaluation shows no central pulmonary embolism.  ASCENDING AORTA:  Aneurysmal dilatation of the aortic root at the level of the sinuses of Valsalva, measuring 49 mm on this gated CT. This previously measured 48 mm in February 2024.  AORTIC ARCH:  The three-vessel arch is normal.  DESCENDING THORACIC AORTA:  The descending thoracic aorta is normal in caliber, with mild atherosclerotic calcifcatations.     ABDOMINAL VASCULAR STRUCTURES:  Suprarenal abdominal aorta is normal in caliber.     VENOUS:  Normal opacification of the visualized upper extremity venous sytem.     OTHER FINDINGS:     LUNGS: No new or enlarging pulmonary nodule. Redemonstrated are multiple small pulmonary nodules, all of which are stable dating back until 2019, benign (annotated on series 306).     Left basilar scar. Central airways are normal.     PLEURA:  Unremarkable.   "   MEDIASTINUM AND SANDY:  Unremarkable.     CHEST WALL AND LOWER NECK:   Unremarkable.     VISUALIZED STRUCTURES IN THE UPPER ABDOMEN: Multiple simple hepatic cysts. Previously classified right upper pole exophytic renal cyst. Redemonstrated 35 x 21 mm left adrenal lesion (series 303, image 135), previously characterized as a benign adenoma.     VISUALIZED OSSEOUS STRUCTURES:  There are age appropriate degenerative changes. No acute fracture or destructive osseous lesion.     IMPRESSION:     1.  Stable aneurysmal dilatation of the ascending thoracic aorta at the level of the sinuses of Valsalva, measuring 49 mm. The patient remains under the care of the cardiothoracic surgical service.  2.  Stable benign 35 mm left adrenal adenoma.        Echocardiogram:     Left Ventricle: Left ventricular cavity size is normal. Wall thickness is normal. The left ventricular ejection fraction is 60%. Systolic function is normal. Wall motion is normal. Diastolic function is normal.    IVS: There is sigmoid appearance of the septum.    Right Ventricle: Right ventricular cavity size is normal. Systolic function is normal.    Mitral Valve: There is mild thickening. There is mild annular calcification. There is mild regurgitation.    Aorta: The aortic root is moderately dilated. The ascending aorta is mildly dilated.     Findings    Left Ventricle Left ventricular cavity size is normal. Wall thickness is normal. The left ventricular ejection fraction is 60%. Systolic function is normal. Wall motion is normal. Diastolic function is normal.   Interventricular Septum There is sigmoid appearance of the septum.   Right Ventricle Right ventricular cavity size is normal. Systolic function is normal.   Left Atrium The atrium is normal in size.   Right Atrium The atrium is normal in size.   Aortic Valve The aortic valve is trileaflet. The leaflets are not thickened. The leaflets are not calcified. The leaflets exhibit normal mobility. There is  trace regurgitation. The aortic valve has no significant stenosis.   Mitral Valve There is mild thickening. There is mild annular calcification.  There is mild regurgitation. There is no evidence of stenosis.   Tricuspid Valve Tricuspid valve structure is normal. There is trace regurgitation. There is no evidence of stenosis.   Pulmonic Valve The pulmonic valve was not well visualized. There is no evidence of regurgitation. There is no evidence of stenosis.   Ascending Aorta The aortic root is moderately dilated. The ascending aorta is mildly dilated.   IVC/SVC The inferior vena cava is normal in size.   Pericardium There is no pericardial effusion. The pericardium is normal in appearance.     Left Ventricle Measurements    Function/Volumes   A4C EF 60 %         Left ventricular stroke volume (2D) 55 mL         Dimensions   LVIDd 4.4 cm         LVIDS 3 cm         IVSd 1.1 cm         LVPWd 1 cm         FS 32         Diastolic Filling   MV E' Tissue Velocity Septal 8 cm/s         E/A ratio 1.06         E wave deceleration time 292 ms         MV Peak E López 89 cm/s         MV Peak A López 0.84 m/s               Right Ventricle Measurements    Dimensions   RVID d 3.9 cm         Tricuspid annular plane systolic excursion 2.2 cm               Left Atrium Measurements    Dimensions   LA size 3.5 cm         LA/Ao Ratio 2D 0.7               Mitral Valve Measurements    Stenosis   MV stenosis pressure 1/2 time 85 ms         MV valve area p 1/2 method 2.59               Aorta Measurements    Aortic Dimensions   Ao root 5 cm         Asc Ao 4.2 cm             Results Review Statement: I personally reviewed the following image studies in PACS and associated radiology reports: CT chest and Echocardiogram. My interpretation of the radiology images/reports is: same as above.    Assessment:  Patient Active Problem List    Diagnosis Date Noted    H. pylori infection 07/05/2024    IPMN (intraductal papillary mucinous neoplasm) 03/22/2024     Other chest pain 03/22/2024    Noise-induced hearing loss of left ear 02/08/2024    Post-nasal drip 02/08/2024    Family history of diabetes mellitus 06/13/2022    Peripheral tear of medial meniscus of left knee as current injury 06/13/2022    History of cataract 05/24/2021    Thoracic ascending aortic aneurysm (HCC) 05/24/2021    Abdominal aortic aneurysm (AAA) without rupture (HCC) 09/05/2019    Second hand smoke exposure 09/05/2019    Centrilobular emphysema (HCC) 08/22/2019    Fuchs' corneal dystrophy 01/28/2019    Testicular cyst 08/16/2016    Adrenal adenoma 08/24/2015    Thoracic aortic aneurysm without rupture (HCC) 08/24/2015    Anxiety 08/03/2015     Ascending aortic aneurysm  Aortic Root Anuerysm    Plan:     CTA chest/abdomen/pelvis performed prior to the visit today was reviewed.  Radiographic findings of aortic root show increase in size to ~5.2-5.3cm in diameter increased in size from 6 months ago where it was 4.9mm. ECHO shows trace AI and good heart function, no other significant valve disease. Given these findings, surgery is recommended. Risks and benefits of bentall were discussed in detail today with the patient.  The patient and his wife wish to discuss and will call back if and when they wish to proceed.    Yogesh Guardado was comfortable with our recommendations, and their questions were answered to their satisfaction.  Thank you for allowing us to participate in the care of this patient.     Aortic Aneurysm Instructions were provided to the patient as follows:    1. No lifting more than 50 pounds. Importance of exercise and staying active while maintaining a healthy weight and lifestyle was discussed  2. Maintain a controlled blood pressure with a goal of 120/80. Maintain cholesterol with low cholesterol heart healthy diet and continue to take statin. Maintain follow up with PCP  3. Follow up in [facility] as recommended with radiology follow up as instructed  4. Report to the ER or  call 911 immediately with the following signs / symptoms: sudden onset of back pain, chest pain or shortness of breath.  5. Maintain follow up with cardiologist  6. Patient verbalized understanding of the information discussed and agrees to the treatment plan.     The patient recently had a screening colonoscopy in 2022.  Therefore GI referral is not indicated at this time.     SIGNATURE: Darlene Curtis PA-C  DATE: 10/17/24  TIME: 1:09 PM

## 2024-10-17 NOTE — LETTER
October 17, 2024     Lorrie Franco DO  [address]  [facility] [address]    Patient: Yogesh Guardado   YOB: 1955   Date of Visit: 10/17/2024       Dear Dr. Franco:    Thank you for referring Yogesh Guardado to me for evaluation. Below are my notes for this consultation.    If you have questions, please do not hesitate to call me. I look forward to following your patient along with you.         Sincerely,        John Horne MD        CC: No Recipients    Darlene Curtis PA-C  10/17/2024  2:19 PM  Attested  Aortic Surveillance - Cardiac Surgery   Yogesh Guardado 69 y.o. male MRN: [id number]    History of Present Illness: Yogesh Guardado is a 69 y.o. year old male who presents today for ongoing surveillance of previously identified ascending aortic aneurysm.  They were most recently evaluated in our office with CT imaging 4/11/24. CTA CAP at that time showed ascending aorta measuring 4.2cm and aortic root measuring 4.9cm in greatest diameter.    Upon radiologic examination today in reviewing with Dr Wynne, CTA from 10/7/24 shows the ascending aorta measures 4.2cm and aortic root measures ~5.1cm Recent ECHO on 10/7/24 shows EF 60%, trace AI, mild MR, aortic root measures ~5.2-5.3cm He admits to abiding by his lifting & exertion restrictions since his last visit. He is active & reports walking daily. He denies chest pain, palpitations, SOB, MATSON, LE edema b/l, back pain, arm pain, numbness/tingling/paresthesias in UE b/l, HA, lightheadedness, dizziness, presyncopal symptoms, or syncopal events today or since his last visit. Changes to his medical history since his last visit consist of EUS in 6/2024 for findings of a dilated pancreatic duct. HE was found to have H. Pylori and treated appropriately. BP is usually well controlled on Lisinopril and today BP is 109/69.     In review he has PMH of Ascending aortic aneurysm and aortic root aneurysm, anxiety, recently treated H. Pylori  infection, intraductal papillary mucinous neoplasm (EUS 6/2024, follows with GI), pancreatic cyst, emphysema, h/o colon polyps, [place] (wears hearing aids)    He is here with his wife. They live in [place], [place] and have an active lifestyle, they bike ride on the [place] they enjoy doing yard work outside and building outside. He volunteers for [employer] for [employer]. He is a retired semi conductor manager. He is a non smoker, he did grow up in a home with long term significant smokers in his parents. He has about 1 drink a week and no illegal drug use. He has no known family history of sudden death or aneurysms in his family.     Past Medical History:  Past Medical History:   Diagnosis Date   • Adrenal adenoma    • Anxiety    • Arthritis    • Cardiac disorder    • Cataracts, bilateral    • COPD (chronic obstructive pulmonary disease) (HCC) 08/2019   • Hearing loss    • HL (hearing loss)    • Polyp of colon    • Testicular cyst    • Thoracic aortic aneurysm (HCC)    • Visual impairment          Past Surgical History:   Past Surgical History:   Procedure Laterality Date   • APPENDECTOMY     • CATARACT EXTRACTION, BILATERAL     • EYE SURGERY  jan 2019   • HERNIA REPAIR      2   • SHOULDER SURGERY Left          Family History:  Family History   Problem Relation Age of Onset   • Hypertension Mother    • Hypertension Father    • Throat cancer Father    • Cancer Father    • Cancer Sister    • Coronary artery disease Brother          Social History:    Social History     Substance and Sexual Activity   Alcohol Use Yes   • Alcohol/week: 1.0 standard drink of alcohol   • Types: 1 Cans of beer per week    Comment: 1 beer a week     Social History     Substance and Sexual Activity   Drug Use Not Currently   • Frequency: 1.0 times per week   • Types: Marijuana    Comment: 1970's     Social History     Tobacco Use   Smoking Status Former   • Current packs/day: 0.00   • Types: Cigarettes   • Quit date: 1/1/1973   • Years since  quittin.8   Smokeless Tobacco Never       Home Medications:   Prior to Admission medications    Medication Sig Start Date End Date Taking? Authorizing Provider   cholecalciferol (VITAMIN D3) 1,000 units tablet Take 1,000 Units by mouth daily    Historical Provider, MD   Cyanocobalamin (Vitamin B 12) 500 MCG TABS Take 500 mcg by mouth daily    Historical Provider, MD   escitalopram (LEXAPRO) 10 mg tablet Take 1 tablet (10 mg total) by mouth daily 24   [facility] Salvador, DO   lisinopril (ZESTRIL) 10 mg tablet Take 1 tablet (10 mg total) by mouth daily 24   [facility] Salvador, DO   meloxicam (Mobic) 7.5 mg tablet Take 1 tablet (7.5 mg total) by mouth daily 24   Chalo White DPM   Misc Natural Products (Osteo Bi-Flex Triple Strength) TABS Take by mouth daily 10/1/20   Historical Provider, MD   Multiple Vitamins-Minerals (MULTIVITAMIN ADULT PO) Take 1 tablet by mouth daily    Historical Provider, MD   pantoprazole (PROTONIX) 40 mg tablet Take 1 tablet (40 mg total) by mouth 2 (two) times a day for 14 days, THEN 1 tablet (40 mg total) daily. 7/9/24 10/7/24  Earl Hutchins PA-C       Allergies:  No Known Allergies    Review of Systems:     Review of Systems   Constitutional:  Negative for chills, fatigue and fever.   HENT:  Negative for ear pain and sore throat.    Eyes:  Negative for pain and visual disturbance.   Respiratory:  Negative for cough and shortness of breath.    Cardiovascular:  Negative for chest pain, palpitations and leg swelling.   Gastrointestinal:  Negative for abdominal pain, constipation, diarrhea, nausea and vomiting.   Genitourinary:  Negative for dysuria and hematuria.   Musculoskeletal:  Negative for arthralgias and back pain.   Skin:  Negative for color change and rash.   Neurological:  Negative for seizures and syncope.   All other systems reviewed and are negative.      Vital Signs:     There were no vitals filed for this visit.    Physical Exam:     Physical Exam  Vitals reviewed.  "  Constitutional:       General: He is not in acute distress.     Appearance: Normal appearance. He is normal weight.   HENT:      Head: Normocephalic and atraumatic.      Mouth/Throat:      Mouth: Mucous membranes are moist.      Pharynx: Oropharynx is clear.   Eyes:      Extraocular Movements: Extraocular movements intact.      Conjunctiva/sclera: Conjunctivae normal.      Pupils: Pupils are equal, round, and reactive to light.   Cardiovascular:      Rate and Rhythm: Normal rate and regular rhythm.      Heart sounds: Normal heart sounds. No murmur heard.  Pulmonary:      Effort: Pulmonary effort is normal.      Breath sounds: Normal breath sounds.   Abdominal:      General: Abdomen is flat. Bowel sounds are normal.      Palpations: Abdomen is soft.   Musculoskeletal:         General: No swelling. Normal range of motion.      Cervical back: Normal range of motion and neck supple.   Skin:     General: Skin is warm and dry.   Neurological:      General: No focal deficit present.      Mental Status: He is alert and oriented to person, place, and time.   Psychiatric:         Mood and Affect: Mood normal.         Behavior: Behavior normal.         Lab Results:               Invalid input(s): \"LABGLOM\"      No results found for: \"HGBA1C\"  Lab Results   Component Value Date    TROPONINI <0.02 08/26/2019       Imaging Studies:     CT Chest:   FINDINGS:  THORACIC VASCULAR STRUCTURES:  HEART:  The study is not coronary gated, limiting evaluation.  Within these confines, the heart is normal in size.  There is no pericardial effusion.  Coronary arterial calcifications are mild.  PULMONARY ARTERIES:  The main pulmonary artery is normal in size.  Nontargeted evaluation shows no central pulmonary embolism.  ASCENDING AORTA:  Aneurysmal dilatation of the aortic root at the level of the sinuses of Valsalva, measuring 49 mm on this gated CT. This previously measured 48 mm in February 2024.  AORTIC ARCH:  The three-vessel arch is " normal.  DESCENDING THORACIC AORTA:  The descending thoracic aorta is normal in caliber, with mild atherosclerotic calcifcatations.     ABDOMINAL VASCULAR STRUCTURES:  Suprarenal abdominal aorta is normal in caliber.     VENOUS:  Normal opacification of the visualized upper extremity venous sytem.     OTHER FINDINGS:     LUNGS: No new or enlarging pulmonary nodule. Redemonstrated are multiple small pulmonary nodules, all of which are stable dating back until 2019, benign (annotated on series 306).     Left basilar scar. Central airways are normal.     PLEURA:  Unremarkable.     MEDIASTINUM AND SANDY:  Unremarkable.     CHEST WALL AND LOWER NECK:   Unremarkable.     VISUALIZED STRUCTURES IN THE UPPER ABDOMEN: Multiple simple hepatic cysts. Previously classified right upper pole exophytic renal cyst. Redemonstrated 35 x 21 mm left adrenal lesion (series 303, image 135), previously characterized as a benign adenoma.     VISUALIZED OSSEOUS STRUCTURES:  There are age appropriate degenerative changes. No acute fracture or destructive osseous lesion.     IMPRESSION:     1.  Stable aneurysmal dilatation of the ascending thoracic aorta at the level of the sinuses of Valsalva, measuring 49 mm. The patient remains under the care of the cardiothoracic surgical service.  2.  Stable benign 35 mm left adrenal adenoma.        Echocardiogram:   •  Left Ventricle: Left ventricular cavity size is normal. Wall thickness is normal. The left ventricular ejection fraction is 60%. Systolic function is normal. Wall motion is normal. Diastolic function is normal.  •  IVS: There is sigmoid appearance of the septum.  •  Right Ventricle: Right ventricular cavity size is normal. Systolic function is normal.  •  Mitral Valve: There is mild thickening. There is mild annular calcification. There is mild regurgitation.  •  Aorta: The aortic root is moderately dilated. The ascending aorta is mildly dilated.     Findings    Left Ventricle Left  ventricular cavity size is normal. Wall thickness is normal. The left ventricular ejection fraction is 60%. Systolic function is normal. Wall motion is normal. Diastolic function is normal.   Interventricular Septum There is sigmoid appearance of the septum.   Right Ventricle Right ventricular cavity size is normal. Systolic function is normal.   Left Atrium The atrium is normal in size.   Right Atrium The atrium is normal in size.   Aortic Valve The aortic valve is trileaflet. The leaflets are not thickened. The leaflets are not calcified. The leaflets exhibit normal mobility. There is trace regurgitation. The aortic valve has no significant stenosis.   Mitral Valve There is mild thickening. There is mild annular calcification.  There is mild regurgitation. There is no evidence of stenosis.   Tricuspid Valve Tricuspid valve structure is normal. There is trace regurgitation. There is no evidence of stenosis.   Pulmonic Valve The pulmonic valve was not well visualized. There is no evidence of regurgitation. There is no evidence of stenosis.   Ascending Aorta The aortic root is moderately dilated. The ascending aorta is mildly dilated.   IVC/SVC The inferior vena cava is normal in size.   Pericardium There is no pericardial effusion. The pericardium is normal in appearance.     Left Ventricle Measurements    Function/Volumes   A4C EF 60 %         Left ventricular stroke volume (2D) 55 mL         Dimensions   LVIDd 4.4 cm         LVIDS 3 cm         IVSd 1.1 cm         LVPWd 1 cm         FS 32         Diastolic Filling   MV E' Tissue Velocity Septal 8 cm/s         E/A ratio 1.06         E wave deceleration time 292 ms         MV Peak E López 89 cm/s         MV Peak A López 0.84 m/s               Right Ventricle Measurements    Dimensions   RVID d 3.9 cm         Tricuspid annular plane systolic excursion 2.2 cm               Left Atrium Measurements    Dimensions   LA size 3.5 cm         LA/Ao Ratio 2D 0.7                Mitral Valve Measurements    Stenosis   MV stenosis pressure 1/2 time 85 ms         MV valve area p 1/2 method 2.59               Aorta Measurements    Aortic Dimensions   Ao root 5 cm         Asc Ao 4.2 cm             Results Review Statement: I personally reviewed the following image studies in PACS and associated radiology reports: CT chest and Echocardiogram. My interpretation of the radiology images/reports is: same as above.    Assessment:  Patient Active Problem List    Diagnosis Date Noted   • H. pylori infection 07/05/2024   • IPMN (intraductal papillary mucinous neoplasm) 03/22/2024   • Other chest pain 03/22/2024   • Noise-induced hearing loss of left ear 02/08/2024   • Post-nasal drip 02/08/2024   • Family history of diabetes mellitus 06/13/2022   • Peripheral tear of medial meniscus of left knee as current injury 06/13/2022   • History of cataract 05/24/2021   • Thoracic ascending aortic aneurysm (HCC) 05/24/2021   • Abdominal aortic aneurysm (AAA) without rupture (HCC) 09/05/2019   • Second hand smoke exposure 09/05/2019   • Centrilobular emphysema (HCC) 08/22/2019   • Fuchs' corneal dystrophy 01/28/2019   • Testicular cyst 08/16/2016   • Adrenal adenoma 08/24/2015   • Thoracic aortic aneurysm without rupture (HCC) 08/24/2015   • Anxiety 08/03/2015     Ascending aortic aneurysm  Aortic Root Anuerysm    Plan:     CTA chest/abdomen/pelvis performed prior to the visit today was reviewed.  Radiographic findings of aortic root show increase in size to ~5.2-5.3cm in diameter increased in size from 6 months ago where it was 4.9mm. ECHO shows trace AI and good heart function, no other significant valve disease. Given these findings, surgery is recommended. Risks and benefits of bentall were discussed in detail today with the patient.  The patient and his wife wish to discuss and will call back if and when they wish to proceed.    Yogesh Guardado was comfortable with our recommendations, and their questions  were answered to their satisfaction.  Thank you for allowing us to participate in the care of this patient.     Aortic Aneurysm Instructions were provided to the patient as follows:    1. No lifting more than 50 pounds. Importance of exercise and staying active while maintaining a healthy weight and lifestyle was discussed  2. Maintain a controlled blood pressure with a goal of 120/80. Maintain cholesterol with low cholesterol heart healthy diet and continue to take statin. Maintain follow up with PCP  3. Follow up in Aortic Clinic as recommended with radiology follow up as instructed  4. Report to the ER or call 911 immediately with the following signs / symptoms: sudden onset of back pain, chest pain or shortness of breath.  5. Maintain follow up with cardiologist  6. Patient verbalized understanding of the information discussed and agrees to the treatment plan.     The patient recently had a screening colonoscopy in 2022.  Therefore GI referral is not indicated at this time.     SIGNATURE: Darlene Curtis PA-C  DATE: 10/17/24  TIME: 1:09 PM   Attestation signed by John Horne MD at 10/17/2024  4:38 PM:  Attending Attestation:    I supervised the Advanced Practitioner on 10/17/2024.  I discussed the case with the Advanced Practitioner, reviewed the note and agree.    The patient is a very pleasant 69-year-old man that returns to aortic clinic on a 6-month follow-up for an aortic root aneurysm.  The patient is currently asymptomatic.  I personally reviewed his diagnostic images, there has been progressive increase in the aneurysm size, CT on 10/29/2021 shows an aortic root of 4.6 cm, CT on 2/16/2024 increase in size to 47 mm, CT on 3/16/2024 4.9 cm, most recent CT on 10/7/2024 was reported as no changes by radiologist but I disagree with their measurement, I used 3D reconstructions and in the AW service saw for and obtain a maximum diameter of 5.4 cm at the level of the sinus of Valsalva which represents an  increase in size from previous studies.  TTE on 10/7/2024 shows a trileaflet valve, trace AI, aortic root 5.3.cm.  I had an extensive and detailed conversation with the patient and his wife, I explained the diagnosis and the treatment options.  Based on the rapid increase in the aneurysm size with 5 mm in less than a year he meets indication for surgery.  They understand the diagnosis and my recommendations but they are not ready to make any decisions, they want to talk about their options and will contact my office if they decide to move forward with surgery.  They asked me if it would be safe to wait a few months to make the decision, I do not think he is safe, I strongly recommend to move forward with surgery at this time.  In the meantime I explained to him the importance of maintaining adequate blood pressure control, avoid tobacco products, avoid sustained straining, he is currently working on a house and kitchen renovation, I strongly recommended him not to perform that kind of work as it will cause him to strain.  He was instructed to go to the emergency department if he were to experience severe chest pain or severe upper back pain.

## 2024-11-19 ENCOUNTER — TELEPHONE (OUTPATIENT)
Dept: INTERNAL MEDICINE CLINIC | Facility: OTHER | Age: 69
End: 2024-11-19

## 2024-11-19 NOTE — TELEPHONE ENCOUNTER
Received records request from Dr. Robles's Office at [facility].  They were requesting office notes and test results.  The form has been scanned into patients chart and faxed to [facility] [place].

## 2024-11-20 NOTE — TELEPHONE ENCOUNTER
Meli from Dr Robles's office called and stated that she called [facility] regarding this patients request and [facility] told her that they did not receive anything requesting his records since 2019. Meli is asking if you would please refax it to [facility].   - - -

## 2024-11-22 DIAGNOSIS — A04.8 H. PYLORI INFECTION: Primary | ICD-10-CM

## 2024-12-02 ENCOUNTER — TELEPHONE (OUTPATIENT)
Dept: GASTROENTEROLOGY | Facility: CLINIC | Age: 69
End: 2024-12-02

## 2024-12-02 NOTE — TELEPHONE ENCOUNTER
"----- Message from Lisa Mcmullen PA-C sent at 11/22/2024 11:15 AM EST -----  Regarding: RE: Lab Order Recall  H. pylori stool test order placed.  Please call patient and let him know to get this done.  He needs to stop taking pantoprazole/Protonix if he is still taking this for 2 weeks prior to submitting the stool test to ensure this is accurate.  Thank you!  ----- Message -----  From: Anne Rangel  Sent: 11/22/2024  11:06 AM EST  To: Gastroenterology [facility] Provider  Subject: Lab Order Recall                                 Good Morning,     As per Dr Welch recall, \"stool studies-recent upper endoscopy confirmed H. pylori infection. repeat stool study follow up in 3-4 month\"    Can an order please be placed?     Thank You!  "

## 2024-12-12 ENCOUNTER — APPOINTMENT (OUTPATIENT)
Dept: LAB | Age: 69
End: 2024-12-12
Payer: COMMERCIAL

## 2024-12-12 DIAGNOSIS — A04.8 H. PYLORI INFECTION: ICD-10-CM

## 2024-12-12 PROCEDURE — 87338 HPYLORI STOOL AG IA: CPT

## 2024-12-15 LAB — H PYLORI AG STL QL IA: NEGATIVE

## 2024-12-16 ENCOUNTER — RESULTS FOLLOW-UP (OUTPATIENT)
Dept: OTHER | Facility: HOSPITAL | Age: 69
End: 2024-12-16

## 2025-01-03 DIAGNOSIS — I10 ESSENTIAL HYPERTENSION: ICD-10-CM

## 2025-01-06 RX ORDER — LISINOPRIL 10 MG/1
10 TABLET ORAL DAILY
Qty: 90 TABLET | Refills: 1 | Status: SHIPPED | OUTPATIENT
Start: 2025-01-06

## 2025-02-12 ENCOUNTER — RA CDI HCC (OUTPATIENT)
Dept: OTHER | Facility: HOSPITAL | Age: 70
End: 2025-02-12

## 2025-02-18 ENCOUNTER — OFFICE VISIT (OUTPATIENT)
Dept: INTERNAL MEDICINE CLINIC | Age: 70
End: 2025-02-18
Payer: COMMERCIAL

## 2025-02-18 VITALS
DIASTOLIC BLOOD PRESSURE: 60 MMHG | OXYGEN SATURATION: 100 % | WEIGHT: 180 LBS | HEART RATE: 80 BPM | HEIGHT: 73 IN | TEMPERATURE: 97.3 F | SYSTOLIC BLOOD PRESSURE: 116 MMHG | BODY MASS INDEX: 23.86 KG/M2

## 2025-02-18 DIAGNOSIS — Z00.00 ENCOUNTER FOR ANNUAL WELLNESS VISIT (AWV) IN MEDICARE PATIENT: ICD-10-CM

## 2025-02-18 DIAGNOSIS — Z13.220 SCREENING CHOLESTEROL LEVEL: ICD-10-CM

## 2025-02-18 DIAGNOSIS — Z12.11 SCREENING FOR COLON CANCER: ICD-10-CM

## 2025-02-18 DIAGNOSIS — D49.0 IPMN (INTRADUCTAL PAPILLARY MUCINOUS NEOPLASM): ICD-10-CM

## 2025-02-18 DIAGNOSIS — Z12.11 SCREENING FOR MALIGNANT NEOPLASM OF COLON: ICD-10-CM

## 2025-02-18 DIAGNOSIS — Z12.5 SCREENING PSA (PROSTATE SPECIFIC ANTIGEN): ICD-10-CM

## 2025-02-18 DIAGNOSIS — Q25.43 AORTIC ROOT ANEURYSM: ICD-10-CM

## 2025-02-18 DIAGNOSIS — Z86.69 HISTORY OF CATARACT: ICD-10-CM

## 2025-02-18 DIAGNOSIS — I71.40 ABDOMINAL AORTIC ANEURYSM (AAA) WITHOUT RUPTURE, UNSPECIFIED PART (HCC): ICD-10-CM

## 2025-02-18 DIAGNOSIS — I10 ESSENTIAL HYPERTENSION: Primary | ICD-10-CM

## 2025-02-18 DIAGNOSIS — F41.9 ANXIETY: ICD-10-CM

## 2025-02-18 PROBLEM — R07.89 OTHER CHEST PAIN: Status: RESOLVED | Noted: 2024-03-22 | Resolved: 2025-02-18

## 2025-02-18 PROBLEM — J43.2 CENTRILOBULAR EMPHYSEMA (HCC): Status: RESOLVED | Noted: 2019-08-22 | Resolved: 2025-02-18

## 2025-02-18 PROCEDURE — 99214 OFFICE O/P EST MOD 30 MIN: CPT | Performed by: FAMILY MEDICINE

## 2025-02-18 PROCEDURE — G0439 PPPS, SUBSEQ VISIT: HCPCS | Performed by: FAMILY MEDICINE

## 2025-02-18 NOTE — PROGRESS NOTES
Assessment/Plan:    1. Essential hypertension  2. Screening for malignant neoplasm of colon  3. IPMN (intraductal papillary mucinous neoplasm)  Assessment & Plan:         4. History of cataract  Assessment & Plan:         5. Abdominal aortic aneurysm (AAA) without rupture, unspecified part (HCC)  Assessment & Plan:    Orders:    Comprehensive metabolic panel; Future    CBC and differential; Future      Orders:  -     Comprehensive metabolic panel; Future; Expected date: 03/04/2025  -     CBC and differential; Future  6. Screening PSA (prostate specific antigen)  -     PSA, Total Screen; Future; Expected date: 03/04/2025  7. Screening cholesterol level  -     Lipid Panel with Direct LDL reflex; Future; Expected date: 03/04/2025  8. Encounter for annual wellness visit (AWV) in Medicare patient  9. Screening for colon cancer  -     Ambulatory Referral to Gastroenterology; Future  10. Anxiety  11. Aortic root aneurysm          Patient Instructions   Medicare Preventive Visit Patient Instructions  Thank you for completing your Welcome to Medicare Visit or Medicare Annual Wellness Visit today. Your next wellness visit will be due in one year (2/19/2026).  The screening/preventive services that you may require over the next 5-10 years are detailed below. Some tests may not apply to you based off risk factors and/or age. Screening tests ordered at today's visit but not completed yet may show as past due. Also, please note that scanned in results may not display below.  Preventive Screenings:  Service Recommendations Previous Testing/Comments   Colorectal Cancer Screening  Colonoscopy    Fecal Occult Blood Test (FOBT)/Fecal Immunochemical Test (FIT)  Fecal DNA/Cologuard Test  Flexible Sigmoidoscopy Age: 45-75 years old   Colonoscopy: every 10 years (May be performed more frequently if at higher risk)  OR  FOBT/FIT: every 1 year  OR  Cologuard: every 3 years  OR  Sigmoidoscopy: every 5 years  Screening may be recommended  earlier than age 45 if at higher risk for colorectal cancer. Also, an individualized decision between you and your healthcare provider will decide whether screening between the ages of 76-85 would be appropriate. Colonoscopy: 12/02/2015  FOBT/FIT: Not on file  Cologuard: Not on file  Sigmoidoscopy: Not on file    Screening Current     Prostate Cancer Screening Individualized decision between patient and health care provider in men between ages of 55-69   Medicare will cover every 12 months beginning on the day after your 50th birthday PSA: 1.50 ng/mL           Hepatitis C Screening Once for adults born between 1945 and 1965  More frequently in patients at high risk for Hepatitis C Hep C Antibody: 01/17/2019    Screening Current   Diabetes Screening 1-2 times per year if you're at risk for diabetes or have pre-diabetes Fasting glucose: 103 mg/dL (2/12/2024)  A1C: No results in last 5 years (No results in last 5 years)  Screening Current   Cholesterol Screening Once every 5 years if you don't have a lipid disorder. May order more often based on risk factors. Lipid panel: 02/12/2024  Screening Current      Other Preventive Screenings Covered by Medicare:  Abdominal Aortic Aneurysm (AAA) Screening: covered once if your at risk. You're considered to be at risk if you have a family history of AAA or a male between the age of 65-75 who smoking at least 100 cigarettes in your lifetime.  Lung Cancer Screening: covers low dose CT scan once per year if you meet all of the following conditions: (1) Age 55-77; (2) No signs or symptoms of lung cancer; (3) Current smoker or have quit smoking within the last 15 years; (4) You have a tobacco smoking history of at least 20 pack years (packs per day x number of years you smoked); (5) You get a written order from a healthcare provider.  Glaucoma Screening: covered annually if you're considered high risk: (1) You have diabetes OR (2) Family history of glaucoma OR (3)   aged 50 and older OR (4)  American aged 65 and older  Osteoporosis Screening: covered every 2 years if you meet one of the following conditions: (1) Have a vertebral abnormality; (2) On glucocorticoid therapy for more than 3 months; (3) Have primary hyperparathyroidism; (4) On osteoporosis medications and need to assess response to drug therapy.  HIV Screening: covered annually if you're between the age of 15-65. Also covered annually if you are younger than 15 and older than 65 with risk factors for HIV infection. For pregnant patients, it is covered up to 3 times per pregnancy.    Immunizations:  Immunization Recommendations   Influenza Vaccine Annual influenza vaccination during flu season is recommended for all persons aged >= 6 months who do not have contraindications   Pneumococcal Vaccine   * Pneumococcal conjugate vaccine = PCV13 (Prevnar 13), PCV15 (Vaxneuvance), PCV20 (Prevnar 20)  * Pneumococcal polysaccharide vaccine = PPSV23 (Pneumovax) Adults 19-65 yo with certain risk factors or if 65+ yo  If never received any pneumonia vaccine: recommend Prevnar 20 (PCV20)  Give PCV20 if previously received 1 dose of PCV13 or PPSV23   Hepatitis B Vaccine 3 dose series if at intermediate or high risk (ex: diabetes, end stage renal disease, liver disease)   Respiratory syncytial virus (RSV) Vaccine - COVERED BY MEDICARE PART D  * RSVPreF3 (Arexvy) CDC recommends that adults 60 years of age and older may receive a single dose of RSV vaccine using shared clinical decision-making (SCDM)   Tetanus (Td) Vaccine - COST NOT COVERED BY MEDICARE PART B Following completion of primary series, a booster dose should be given every 10 years to maintain immunity against tetanus. Td may also be given as tetanus wound prophylaxis.   Tdap Vaccine - COST NOT COVERED BY MEDICARE PART B Recommended at least once for all adults. For pregnant patients, recommended with each pregnancy.   Shingles Vaccine (Shingrix) - COST NOT  COVERED BY MEDICARE PART B  2 shot series recommended in those 19 years and older who have or will have weakened immune systems or those 50 years and older     Health Maintenance Due:      Topic Date Due    Colorectal Cancer Screening  02/02/2022    Hepatitis C Screening  Completed     Immunizations Due:      Topic Date Due    COVID-19 Vaccine (5 - 2024-25 season) 09/01/2024     Advance Directives   What are advance directives?  Advance directives are legal documents that state your wishes and plans for medical care. These plans are made ahead of time in case you lose your ability to make decisions for yourself. Advance directives can apply to any medical decision, such as the treatments you want, and if you want to donate organs.   What are the types of advance directives?  There are many types of advance directives, and each state has rules about how to use them. You may choose a combination of any of the following:  Living will:  This is a written record of the treatment you want. You can also choose which treatments you do not want, which to limit, and which to stop at a certain time. This includes surgery, medicine, IV fluid, and tube feedings.   Durable power of  for healthcare (DPAHC):  This is a written record that states who you want to make healthcare choices for you when you are unable to make them for yourself. This person, called a proxy, is usually a family member or a friend. You may choose more than 1 proxy.  Do not resuscitate (DNR) order:  A DNR order is used in case your heart stops beating or you stop breathing. It is a request not to have certain forms of treatment, such as CPR. A DNR order may be included in other types of advance directives.  Medical directive:  This covers the care that you want if you are in a coma, near death, or unable to make decisions for yourself. You can list the treatments you want for each condition. Treatment may include pain medicine, surgery, blood  "transfusions, dialysis, IV or tube feedings, and a ventilator (breathing machine).  Values history:  This document has questions about your views, beliefs, and how you feel and think about life. This information can help others choose the care that you would choose.  Why are advance directives important?  An advance directive helps you control your care. Although spoken wishes may be used, it is better to have your wishes written down. Spoken wishes can be misunderstood, or not followed. Treatments may be given even if you do not want them. An advance directive may make it easier for your family to make difficult choices about your care.       © Copyright [employer] 2018 Information is for End User's use only and may not be sold, redistributed or otherwise used for commercial purposes. All illustrations and images included in CareNotes® are the copyrighted property of [email or web address] or [employer]       Return in about 6 months (around 8/18/2025).    Subjective:      Patient ID: Yogesh Guardado is a 69 y.o. male.    Chief Complaint   Patient presents with    Medicare Wellness Visit     AWV-SUB- ordered cologuard per patient        HPI    Answers submitted by the patient for this visit:  Medicare Annual Wellness Visit (Submitted on 2/15/2025)  How would you rate your overall health?: very good  Compared to last year, how is your physical health?: same  In general, how satisfied are you with your life?: satisfied  Compared to last year, how is your eyesight?: slightly worse  Compared to last year, how is your hearing?: slightly worse  Compared to last year, how is your emotional/mental health?: same  How often is anger a problem for you?: sometimes  How often do you feel unusually tired/fatigued?: sometimes  In the past 7 days, how much pain have you experienced?: some  If you answered \"some\" or \"a lot\", please rate the severity of your pain on a scale of 1 to 10 (1 being the least severe pain and 10 being " the most intense pain).: 3/10  In the past 6 months, have you lost or gained 10 pounds without trying?: No  One or more falls in the last year: No  Do you have trouble with the stairs inside or outside your home?: No  Does your home have working smoke alarms?: Yes  Does your home have a carbon monoxide monitor?: Yes  Which safety hazards (if any) have you experienced in your home? Please select all that apply.: none  How would you describe your current diet? Please select all that apply.: Regular  In addition to prescription medications, are you taking any over-the-counter supplements?: Yes  If yes, what supplements are you taking?: vitamins  Can you manage your medications?: Yes  Are you currently taking any opioid medications?: No  Can you walk and transfer into and out of your bed and chair?: Yes  Can you dress and groom yourself?: Yes  Can you bathe or shower yourself?: Yes  Can you feed yourself?: Yes  Can you do your laundry/ housekeeping?: Yes  Can you manage your money, pay your bills, and track your expenses?: Yes  Can you make your own meals?: Yes  Can you do your own shopping?: Yes  Within the last 12 months, have you had any hospitalizations or Emergency Department visits?: Yes  If yes, how many times have you been hospitalized within the past year?: 1-2  Do you have a living will?: Yes  Do you have a Durable POA (Power of ) for healthcare decisions?: Yes  Do you have an Advanced Directive for end of life decisions?: Yes  How often have you used an illegal drug (including marijuana) or a prescription medication for non-medical reasons in the past year?: never  What is the typical number of drinks you consume in a day?: 0  What is the typical number of drinks you consume in a week?: 1  How often did you have a drink containing alcohol in the past year?: monthly or less  How many drinks did you have on a typical day  when you were drinking in the past year?: 1 to 2  How often did you have 6 or more  drinks on one occasion in the past year?: never      Thoracic ascending aorta aneurysm.  Stable at 4 cm on CT scan done in 2019.  He is due for follow-up.  No symptoms. Nov 2021: stable , had repeat Ct scan  June 2022, has never been evaluated by vascular surgery  February 2024, due for follow-up repeat CT scan  February 2025, doing well and following with cardiology and cardiothoracic surgery, following aortic root aneurysm.  He will have coronary catheterization in 1 month and depending on that we will determine if he has surgical repair of aortic root or continued surveillance.       COPD.  Smoking history for 1 year but significant secondhand smoke exposure.  Takes his Advair but only daily now and feels well.  No breakthrough symptoms of shortness of breath or wheezing or coughing.  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  Nov 2021: stable, no wheezing and SOB, no MATSON  June 2022, still wheezing, no progression in symptoms  February 2024, not wheezing, no progression in symptoms.  No shortness of breath no dyspnea on exertion.  Stopped taking his inhaler due to this.  February 2025, stable     Anxiety, taking Lexapro half tablet at 5 mg daily without any breakthrough symptoms.  No HI or SI and sleeping well.  Does not feel that he needs any changes.  Nov 2021: no HI or SI, doing well, no side effects   June 2022, doing well.  No side effects with Lexapro.  No HI or SI.  February 2024, doing well, no breakthrough symptoms, no HI or SI,  February 2025, on low-dose Lexapro tolerating well he cuts his pill in forths and feels that he does not want any changes.  No HI or SI     Hard of hearing, with hearing aids, left side worse than right, most recently broke his left hearing aid and thinks he may have another problem with his ears as he feels congested and is hearing less  February 2025, stable, no new issues    No chest pain since last visit, doing well  in general.    IPMN, patient had EUS done by GI that was all resolved taking care of.  He had H. pylori found that was treated.  He is due for colonoscopy this year.      The following portions of the patient's history were reviewed and updated as appropriate: allergies, current medications, past family history, past medical history, past social history, past surgical history and problem list.    Review of Systems        Constitutional:  Denies fever or chills   Eyes:  Denies double , blurry vision or eye pain  HENT:  Denies nasal congestion, sore throat or new hearing issues  Respiratory:  Denies cough or shortness of breath or wheezing  Cardiovascular:  Denies palpitations or chest pain  GI:  Denies abdominal pain, nausea, or vomiting, no loose stools, no reflux  Integument:  Denies rash , no open areas  Neurologic:  Denies headache or focal weakness, no dizziness  : no dysuria, or hematuria    Current Outpatient Medications   Medication Sig Dispense Refill    cholecalciferol (VITAMIN D3) 1,000 units tablet Take 1,000 Units by mouth daily      Cyanocobalamin (Vitamin B 12) 500 MCG TABS Take 500 mcg by mouth daily      escitalopram (LEXAPRO) 10 mg tablet Take 1 tablet (10 mg total) by mouth daily 90 tablet 0    lisinopril (ZESTRIL) 10 mg tablet Take 1 tablet (10 mg total) by mouth daily 90 tablet 1    meloxicam (Mobic) 7.5 mg tablet Take 1 tablet (7.5 mg total) by mouth daily 30 tablet 3    Misc Natural Products (Osteo Bi-Flex Triple Strength) TABS Take by mouth daily      Multiple Vitamins-Minerals (MULTIVITAMIN ADULT PO) Take 1 tablet by mouth daily       Current Facility-Administered Medications   Medication Dose Route Frequency Provider Last Rate Last Admin    bupivacaine (MARCAINE) 0.25 % injection 1 mL  1 mL Infiltration     1 mL at 03/26/24 0915    bupivacaine (MARCAINE) 0.5 % injection 1 mL  1 mL      1 mL at 03/26/24 0915    bupivacaine (PF) (MARCAINE) 0.25 % injection 1 mL  1 mL Intra-articular  Chalo  "[place] White, DPM   1 mL at 12/09/22 1454    bupivacaine (PF) (MARCAINE) 0.25 % injection 1 mL  1 mL Intra-articular  [place]alo [place] White, DPM   1 mL at 03/30/23 1647    bupivacaine (PF) (MARCAINE) 0.25 % injection 1 mL  1 mL Intra-articular  Chalo [place] White, DPM   1 mL at 08/21/23 0915    bupivacaine (PF) (MARCAINE) 0.25 % injection 1 mL  1 mL Intra-articular     1 mL at 09/30/24 0915    lidocaine (XYLOCAINE) 1 % injection 1 mL  1 mL Injection     1 mL at 09/30/24 0915    methylPREDNISolone acetate (DEPO-MEDROL) injection 0.5 mL  0.5 mL Intra-articular  Chalo [place] White, DPM   0.5 mL at 03/30/23 1647    triamcinolone acetonide (KENALOG-40) 40 mg/mL injection 20 mg  20 mg Intra-articular  [facility]   20 mg at 12/09/22 1454    triamcinolone acetonide (KENALOG-40) 40 mg/mL injection 20 mg  20 mg Intra-articular  [facility] White, DPM   20 mg at 08/21/23 0915    triamcinolone acetonide (KENALOG-40) 40 mg/mL injection 20 mg  20 mg Infiltration     20 mg at 03/26/24 0915    triamcinolone acetonide (KENALOG-40) 40 mg/mL injection 20 mg  20 mg Intra-articular     20 mg at 03/26/24 0915    triamcinolone acetonide (KENALOG-40) 40 mg/mL injection 40 mg  40 mg Intra-articular     40 mg at 09/30/24 0915       Objective:    /60 (BP Location: Left arm, Patient Position: Sitting, Cuff Size: Standard)   Pulse 80   Temp (!) 97.3 °F (36.3 °C) (Temporal)   Ht 6' 1\" (1.854 m)   Wt 81.6 kg (180 lb)   SpO2 100%   BMI 23.75 kg/m²        Physical Exam      Constitutional:  Well developed, well nourished, no acute distress, non-toxic appearance   Eyes:  PERRL, conjunctiva normal , non icteric sclera  HENT:  Atraumatic, oropharynx moist. Neck-  supple   Respiratory:  CTA b/l, normal breath sounds, no rales, no wheezing   Cardiovascular:  RRR, no murmurs, no LE edema b/l  GI:  Soft, nondistended, normal bowel sounds x 4, nontender, no organomegaly, no mass, no rebound, no guarding   Neurologic:  no focal deficits noted "   Psychiatric:  Speech and behavior appropriate , AAO x 3      Lorrie Franco DO

## 2025-02-18 NOTE — PROGRESS NOTES
Name: Yogesh Guardado      : 1955      MRN: [id number]  Encounter Provider: Lorrie Franco DO  Encounter Date: 2025   Encounter department: [facility]    Assessment & Plan  Screening for malignant neoplasm of colon         IPMN (intraductal papillary mucinous neoplasm)         History of cataract         Abdominal aortic aneurysm (AAA) without rupture, unspecified part (HCC)    Orders:    Comprehensive metabolic panel; Future    CBC and differential; Future    Screening PSA (prostate specific antigen)    Orders:    PSA, Total Screen; Future    Screening cholesterol level    Orders:    Lipid Panel with Direct LDL reflex; Future    Encounter for annual wellness visit (AWV) in Medicare patient         Screening for colon cancer    Orders:    Ambulatory Referral to Gastroenterology; Future       Preventive health issues were discussed with patient, and age appropriate screening tests were ordered as noted in patient's After Visit Summary. Personalized health advice and appropriate referrals for health education or preventive services given if needed, as noted in patient's After Visit Summary.    History of Present Illness     HPI   Patient Care Team:  Lorrie Franco DO as PCP - General  MD Brodie Hicks MD    Review of Systems  Medical History Reviewed by provider this encounter:  Tobacco  Allergies  Meds  Problems  Med Hx  Surg Hx  Fam Hx       Annual Wellness Visit Questionnaire   Yogesh is here for his Subsequent Wellness visit.     Health Risk Assessment:   Patient rates overall health as very good. Patient feels that their physical health rating is same. Patient is satisfied with their life. Eyesight was rated as slightly worse. Hearing was rated as slightly worse. Patient feels that their emotional and mental health rating is same. Patients states they are sometimes angry. Patient states they are sometimes unusually tired/fatigued. Pain experienced in  the last 7 days has been some. Patient's pain rating has been 3/10. Patient states that he has experienced no weight loss or gain in last 6 months.     Fall Risk Screening:   In the past year, patient has experienced: no history of falling in past year      Home Safety:  Patient does not have trouble with stairs inside or outside of their home. Patient has working smoke alarms and has working carbon monoxide detector. Home safety hazards include: none.     Nutrition:   Current diet is Regular.     Medications:   Patient is currently taking over-the-counter supplements. OTC medications include: see medication list. Patient is able to manage medications.     Activities of Daily Living (ADLs)/Instrumental Activities of Daily Living (IADLs):   Walk and transfer into and out of bed and chair?: Yes  Dress and groom yourself?: Yes    Bathe or shower yourself?: Yes    Feed yourself? Yes  Do your laundry/housekeeping?: Yes  Manage your money, pay your bills and track your expenses?: Yes  Make your own meals?: Yes    Do your own shopping?: Yes    Previous Hospitalizations:   Any hospitalizations or ED visits within the last 12 months?: Yes    How many hospitalizations have you had in the last year?: 1-2    Advance Care Planning:   Living will: Yes    Durable POA for healthcare: Yes    Advanced directive: Yes      Comments: His wife    Cognitive Screening:   Provider or family/friend/caregiver concerned regarding cognition?: No    PREVENTIVE SCREENINGS      Cardiovascular Screening:    General: Screening Current      Diabetes Screening:     General: Screening Current      Colorectal Cancer Screening:     General: Screening Current      Abdominal Aortic Aneurysm (AAA) Screening:    Risk factors include: age between 65-76 yo and tobacco use        General: Screening Not Indicated and History AAA      Lung Cancer Screening:     General: Screening Not Indicated      Hepatitis C Screening:    General: Screening  Current    Screening, Brief Intervention, and Referral to Treatment (SBIRT)     Screening  Typical number of drinks in a day: 0  Typical number of drinks in a week: 1  Interpretation: Low risk drinking behavior.    AUDIT-C Screenin) How often did you have a drink containing alcohol in the past year? monthly or less  2) How many drinks did you have on a typical day when you were drinking in the past year? 1 to 2  3) How often did you have 6 or more drinks on one occasion in the past year? never    AUDIT-C Score: 1  Interpretation: Score 0-3 (male): Negative screen for alcohol misuse    Single Item Drug Screening:  How often have you used an illegal drug (including marijuana) or a prescription medication for non-medical reasons in the past year? never    Single Item Drug Screen Score: 0  Interpretation: Negative screen for possible drug use disorder    Brief Intervention  Alcohol & drug use screenings were reviewed. No concerns regarding substance use disorder identified.     Other Counseling Topics:   Car/seat belt/driving safety, skin self-exam, sunscreen and calcium and vitamin D intake and regular weightbearing exercise.     Social Drivers of Health     Financial Resource Strain: Low Risk  (2024)    Overall Financial Resource Strain (CARDIA)     Difficulty of Paying Living Expenses: Not very hard   Food Insecurity: Unknown (2/15/2025)    Hunger Vital Sign     Ran Out of Food in the Last Year: Never true   Transportation Needs: No Transportation Needs (2/15/2025)    PRAPARE - Transportation     Lack of Transportation (Medical): No     Lack of Transportation (Non-Medical): No   Housing Stability: Unknown (2/15/2025)    Housing Stability Vital Sign     Unable to Pay for Housing in the Last Year: No     Homeless in the Last Year: No   Utilities: Not At Risk (2/15/2025)    [facility] Utilities     Threatened with loss of utilities: No     No results found.    Objective   /60 (BP Location: Left arm, Patient  "Position: Sitting, Cuff Size: Standard)   Pulse 80   Temp (!) 97.3 °F (36.3 °C) (Temporal)   Ht 6' 1\" (1.854 m)   Wt 81.6 kg (180 lb)   SpO2 100%   BMI 23.75 kg/m²     Physical Exam    "

## 2025-02-18 NOTE — PATIENT INSTRUCTIONS
Medicare Preventive Visit Patient Instructions  Thank you for completing your Welcome to Medicare Visit or Medicare Annual Wellness Visit today. Your next wellness visit will be due in one year (2/19/2026).  The screening/preventive services that you may require over the next 5-10 years are detailed below. Some tests may not apply to you based off risk factors and/or age. Screening tests ordered at today's visit but not completed yet may show as past due. Also, please note that scanned in results may not display below.  Preventive Screenings:  Service Recommendations Previous Testing/Comments   Colorectal Cancer Screening  Colonoscopy    Fecal Occult Blood Test (FOBT)/Fecal Immunochemical Test (FIT)  Fecal DNA/Cologuard Test  Flexible Sigmoidoscopy Age: 45-75 years old   Colonoscopy: every 10 years (May be performed more frequently if at higher risk)  OR  FOBT/FIT: every 1 year  OR  Cologuard: every 3 years  OR  Sigmoidoscopy: every 5 years  Screening may be recommended earlier than age 45 if at higher risk for colorectal cancer. Also, an individualized decision between you and your healthcare provider will decide whether screening between the ages of 76-85 would be appropriate. Colonoscopy: 12/02/2015  FOBT/FIT: Not on file  Cologuard: Not on file  Sigmoidoscopy: Not on file    Screening Current     Prostate Cancer Screening Individualized decision between patient and health care provider in men between ages of 55-69   Medicare will cover every 12 months beginning on the day after your 50th birthday PSA: 1.50 ng/mL           Hepatitis C Screening Once for adults born between 1945 and 1965  More frequently in patients at high risk for Hepatitis C Hep C Antibody: 01/17/2019    Screening Current   Diabetes Screening 1-2 times per year if you're at risk for diabetes or have pre-diabetes Fasting glucose: 103 mg/dL (2/12/2024)  A1C: No results in last 5 years (No results in last 5 years)  Screening Current   Cholesterol  Screening Once every 5 years if you don't have a lipid disorder. May order more often based on risk factors. Lipid panel: 02/12/2024  Screening Current      Other Preventive Screenings Covered by Medicare:  Abdominal Aortic Aneurysm (AAA) Screening: covered once if your at risk. You're considered to be at risk if you have a family history of AAA or a male between the age of 65-75 who smoking at least 100 cigarettes in your lifetime.  Lung Cancer Screening: covers low dose CT scan once per year if you meet all of the following conditions: (1) Age 55-77; (2) No signs or symptoms of lung cancer; (3) Current smoker or have quit smoking within the last 15 years; (4) You have a tobacco smoking history of at least 20 pack years (packs per day x number of years you smoked); (5) You get a written order from a healthcare provider.  Glaucoma Screening: covered annually if you're considered high risk: (1) You have diabetes OR (2) Family history of glaucoma OR (3)  aged 50 and older OR (4)  American aged 65 and older  Osteoporosis Screening: covered every 2 years if you meet one of the following conditions: (1) Have a vertebral abnormality; (2) On glucocorticoid therapy for more than 3 months; (3) Have primary hyperparathyroidism; (4) On osteoporosis medications and need to assess response to drug therapy.  HIV Screening: covered annually if you're between the age of 15-65. Also covered annually if you are younger than 15 and older than 65 with risk factors for HIV infection. For pregnant patients, it is covered up to 3 times per pregnancy.    Immunizations:  Immunization Recommendations   Influenza Vaccine Annual influenza vaccination during flu season is recommended for all persons aged >= 6 months who do not have contraindications   Pneumococcal Vaccine   * Pneumococcal conjugate vaccine = PCV13 (Prevnar 13), PCV15 (Vaxneuvance), PCV20 (Prevnar 20)  * Pneumococcal polysaccharide vaccine = PPSV23  (Pneumovax) Adults 19-65 yo with certain risk factors or if 65+ yo  If never received any pneumonia vaccine: recommend Prevnar 20 (PCV20)  Give PCV20 if previously received 1 dose of PCV13 or PPSV23   Hepatitis B Vaccine 3 dose series if at intermediate or high risk (ex: diabetes, end stage renal disease, liver disease)   Respiratory syncytial virus (RSV) Vaccine - COVERED BY MEDICARE PART D  * RSVPreF3 (Arexvy) CDC recommends that adults 60 years of age and older may receive a single dose of RSV vaccine using shared clinical decision-making (SCDM)   Tetanus (Td) Vaccine - COST NOT COVERED BY MEDICARE PART B Following completion of primary series, a booster dose should be given every 10 years to maintain immunity against tetanus. Td may also be given as tetanus wound prophylaxis.   Tdap Vaccine - COST NOT COVERED BY MEDICARE PART B Recommended at least once for all adults. For pregnant patients, recommended with each pregnancy.   Shingles Vaccine (Shingrix) - COST NOT COVERED BY MEDICARE PART B  2 shot series recommended in those 19 years and older who have or will have weakened immune systems or those 50 years and older     Health Maintenance Due:      Topic Date Due   • Colorectal Cancer Screening  02/02/2022   • Hepatitis C Screening  Completed     Immunizations Due:      Topic Date Due   • COVID-19 Vaccine (5 - 2024-25 season) 09/01/2024     Advance Directives   What are advance directives?  Advance directives are legal documents that state your wishes and plans for medical care. These plans are made ahead of time in case you lose your ability to make decisions for yourself. Advance directives can apply to any medical decision, such as the treatments you want, and if you want to donate organs.   What are the types of advance directives?  There are many types of advance directives, and each state has rules about how to use them. You may choose a combination of any of the following:  Living will:  This is a  written record of the treatment you want. You can also choose which treatments you do not want, which to limit, and which to stop at a certain time. This includes surgery, medicine, IV fluid, and tube feedings.   Durable power of  for healthcare (DPAHC):  This is a written record that states who you want to make healthcare choices for you when you are unable to make them for yourself. This person, called a proxy, is usually a family member or a friend. You may choose more than 1 proxy.  Do not resuscitate (DNR) order:  A DNR order is used in case your heart stops beating or you stop breathing. It is a request not to have certain forms of treatment, such as CPR. A DNR order may be included in other types of advance directives.  Medical directive:  This covers the care that you want if you are in a coma, near death, or unable to make decisions for yourself. You can list the treatments you want for each condition. Treatment may include pain medicine, surgery, blood transfusions, dialysis, IV or tube feedings, and a ventilator (breathing machine).  Values history:  This document has questions about your views, beliefs, and how you feel and think about life. This information can help others choose the care that you would choose.  Why are advance directives important?  An advance directive helps you control your care. Although spoken wishes may be used, it is better to have your wishes written down. Spoken wishes can be misunderstood, or not followed. Treatments may be given even if you do not want them. An advance directive may make it easier for your family to make difficult choices about your care.       © Copyright [employer] 2018 Information is for End User's use only and may not be sold, redistributed or otherwise used for commercial purposes. All illustrations and images included in CareNotes® are the copyrighted property of [employer] or [employer]

## 2025-03-31 ENCOUNTER — OFFICE VISIT (OUTPATIENT)
Dept: PODIATRY | Facility: CLINIC | Age: 70
End: 2025-03-31
Payer: COMMERCIAL

## 2025-03-31 VITALS — WEIGHT: 180 LBS | OXYGEN SATURATION: 97 % | HEIGHT: 73 IN | BODY MASS INDEX: 23.86 KG/M2 | HEART RATE: 73 BPM

## 2025-03-31 DIAGNOSIS — M19.071 DJD (DEGENERATIVE JOINT DISEASE), ANKLE AND FOOT, RIGHT: Primary | ICD-10-CM

## 2025-03-31 DIAGNOSIS — M79.671 RIGHT FOOT PAIN: ICD-10-CM

## 2025-03-31 PROCEDURE — 99213 OFFICE O/P EST LOW 20 MIN: CPT | Performed by: PODIATRIST

## 2025-03-31 PROCEDURE — 20600 DRAIN/INJ JOINT/BURSA W/O US: CPT | Performed by: PODIATRIST

## 2025-03-31 RX ORDER — ASPIRIN 81 MG/1
81 TABLET ORAL DAILY
COMMUNITY
Start: 2025-03-04 | End: 2026-03-04

## 2025-03-31 RX ORDER — TRIAMCINOLONE ACETONIDE 40 MG/ML
20 INJECTION, SUSPENSION INTRA-ARTICULAR; INTRAMUSCULAR
Status: SHIPPED | OUTPATIENT
Start: 2025-03-31

## 2025-03-31 RX ORDER — BUPIVACAINE HYDROCHLORIDE 2.5 MG/ML
1 INJECTION, SOLUTION EPIDURAL; INFILTRATION; INTRACAUDAL; PERINEURAL
Status: SHIPPED | OUTPATIENT
Start: 2025-03-31

## 2025-03-31 RX ADMIN — BUPIVACAINE HYDROCHLORIDE 1 ML: 2.5 INJECTION, SOLUTION EPIDURAL; INFILTRATION; INTRACAUDAL; PERINEURAL at 08:45

## 2025-03-31 RX ADMIN — TRIAMCINOLONE ACETONIDE 20 MG: 40 INJECTION, SUSPENSION INTRA-ARTICULAR; INTRAMUSCULAR at 08:45

## 2025-03-31 NOTE — PROGRESS NOTES
:  Assessment & Plan  DJD (degenerative joint disease), ankle and foot, right         Right foot pain           The patient's clinical examination today is consistent with mild, recurrent tenderness with palpation to the sesamoid apparatus on the right first MTPJ.  Osseous spurring is also noted to the dorsal aspect of the right first MT-cuneiform joint with mild tenderness with palpation. There is no erythema nor edema nor calor noted to the patient's right midfoot.  Pedal pulses on the right foot are palpable.     The patient was amenable to repeat therapy of his arthritic forefoot joint as well as the tibial sesamoid today.  After prepping the skin with Betadine and alcohol, a CSI was administered to the dorsal right first MT-cuneiform joint and plantar right first MTPJ in the area of the tibial sesamoid of the right foot.  The  injection was well-tolerated.     Continue meloxicam on an as-needed basis.  Continue supportive shoe gear and OTC arch supports.    Follow up in 6 months or as needed.    History of Present Illness     Yogesh Guardado is a 69 y.o. male   The patient resents today for follow-up of recurrent right foot pain secondary to osteoarthritis.  He did receive an injection back in September that worked very well.  He noted about 4-6 months of relief.  He is starting to note some increased pain and would like to repeat injection therapy today.  There has been no interval injury or trauma.      PAST MEDICAL HISTORY:  Past Medical History:   Diagnosis Date    Adrenal adenoma     Anxiety     Arthritis     Cardiac disorder     Cataracts, bilateral     COPD (chronic obstructive pulmonary disease) ([facility]) 08/2019    Hearing loss     HL (hearing loss)     Other chest pain 03/22/2024    Polyp of colon     Testicular cyst     Thoracic aortic aneurysm ([facility])     Visual impairment        PAST SURGICAL HISTORY:  Past Surgical History:   Procedure Laterality Date    APPENDECTOMY      CATARACT EXTRACTION, BILATERAL       COLONOSCOPY  2022    EYE SURGERY  jan 2019    HERNIA REPAIR      2    SHOULDER SURGERY Left         ALLERGIES:  Patient has no known allergies.    MEDICATIONS:  Current Outpatient Medications   Medication Sig Dispense Refill    aspirin (ECOTRIN LOW STRENGTH) 81 mg EC tablet Take 81 mg by mouth daily      cholecalciferol (VITAMIN D3) 1,000 units tablet Take 1,000 Units by mouth daily      Cyanocobalamin (Vitamin B 12) 500 MCG TABS Take 500 mcg by mouth daily      escitalopram (LEXAPRO) 10 mg tablet Take 1 tablet (10 mg total) by mouth daily 90 tablet 0    lisinopril (ZESTRIL) 10 mg tablet Take 1 tablet (10 mg total) by mouth daily 90 tablet 1    meloxicam (Mobic) 7.5 mg tablet Take 1 tablet (7.5 mg total) by mouth daily 30 tablet 3    Misc Natural Products (Osteo Bi-Flex Triple Strength) TABS Take by mouth daily      Multiple Vitamins-Minerals (MULTIVITAMIN ADULT PO) Take 1 tablet by mouth daily       Current Facility-Administered Medications   Medication Dose Route Frequency Provider Last Rate Last Admin    bupivacaine (MARCAINE) 0.25 % injection 1 mL  1 mL Infiltration     1 mL at 03/26/24 0915    bupivacaine (MARCAINE) 0.5 % injection 1 mL  1 mL      1 mL at 03/26/24 0915    bupivacaine (PF) (MARCAINE) 0.25 % injection 1 mL  1 mL Intra-articular  [facility], DPM   1 mL at 12/09/22 1454    bupivacaine (PF) (MARCAINE) 0.25 % injection 1 mL  1 mL Intra-articular  [facility], DPM   1 mL at 03/30/23 1647    bupivacaine (PF) (MARCAINE) 0.25 % injection 1 mL  1 mL Intra-articular  [facility], DPM   1 mL at 08/21/23 0915    bupivacaine (PF) (MARCAINE) 0.25 % injection 1 mL  1 mL Intra-articular     1 mL at 09/30/24 0915    lidocaine (XYLOCAINE) 1 % injection 1 mL  1 mL Injection     1 mL at 09/30/24 0915    methylPREDNISolone acetate (DEPO-MEDROL) injection 0.5 mL  0.5 mL Intra-articular  [facility], DPM   0.5 mL at 03/30/23 1647    triamcinolone acetonide (KENALOG-40) 40 mg/mL injection 20 mg   20 mg Intra-articular  Chalo Huber White, DPM   20 mg at 22 1454    triamcinolone acetonide (KENALOG-40) 40 mg/mL injection 20 mg  20 mg Intra-articular  Chalo Huber White, DPM   20 mg at 23 0915    triamcinolone acetonide (KENALOG-40) 40 mg/mL injection 20 mg  20 mg Infiltration     20 mg at 24 0915    triamcinolone acetonide (KENALOG-40) 40 mg/mL injection 20 mg  20 mg Intra-articular     20 mg at 24 0915    triamcinolone acetonide (KENALOG-40) 40 mg/mL injection 40 mg  40 mg Intra-articular     40 mg at 24 0915       SOCIAL HISTORY:  Social History     Socioeconomic History    Marital status: /Civil Union     Spouse name: None    Number of children: None    Years of education: None    Highest education level: None   Occupational History    None   Tobacco Use    Smoking status: Former     Current packs/day: 0.00     Types: Cigarettes     Quit date: 1973     Years since quittin.2    Smokeless tobacco: Never   Vaping Use    Vaping status: Never Used   Substance and Sexual Activity    Alcohol use: Yes     Alcohol/week: 1.0 standard drink of alcohol     Types: 1 Cans of beer per week     Comment: 1 beer a week    Drug use: Not Currently     Frequency: 1.0 times per week     Types: Marijuana     Comment:     Sexual activity: Yes     Partners: Female   Other Topics Concern    None   Social History Narrative    None     Social Drivers of Health     Financial Resource Strain: Not At Risk (3/3/2025)    Received from [facility]    Financial Insecurity     In the last 12 months did you skip medications to save money?: No     In the last 12 months was there a time when you needed to see a doctor but could not because of cost?: No   Food Insecurity: No Food Insecurity (3/3/2025)    Received from [facility]    Food Insecurity     In the last 12 months did you ever eat less than you felt you should because there wasn't enough money for food?:  "No   Transportation Needs: No Transportation Needs (3/3/2025)    Received from [facility]    Transportation Needs     In the last 12 months have you ever had to go without healthcare because you didn't have a way to get there?: No   Physical Activity: Not on file   Stress: Not on file   Social Connections: Socially Integrated (3/3/2025)    Received from [facility]    Social Connection     Do you often feel lonely?: No   Intimate Partner Violence: Not on file   Housing Stability: Not At Risk (3/3/2025)    Received from [facility]    Housing Stability     Are you worried that in the next 2 months you may not have stable housing?: No      Review of Systems   Constitutional: Negative.    HENT: Negative.     Eyes: Negative.    Respiratory: Negative.     Cardiovascular: Negative.    Endocrine: Negative.    Musculoskeletal: Negative.    Neurological: Negative.    Hematological: Negative.    Psychiatric/Behavioral: Negative.       Objective   Pulse 73   Ht 6' 1\" (1.854 m)   Wt 81.6 kg (180 lb)   SpO2 97%   BMI 23.75 kg/m²      Physical Exam  Vitals and nursing note reviewed.   Constitutional:       Appearance: He is well-developed.   HENT:      Head: Normocephalic and atraumatic.   Eyes:      Conjunctiva/sclera: Conjunctivae normal.   Cardiovascular:      Rate and Rhythm: Normal rate and regular rhythm.   Pulmonary:      Effort: Pulmonary effort is normal.      Breath sounds: Normal breath sounds.   Abdominal:      Palpations: Abdomen is soft.   Musculoskeletal:      Cervical back: Neck supple.   Skin:     General: Skin is warm and dry.      Capillary Refill: Capillary refill takes less than 2 seconds.   Neurological:      Mental Status: He is alert.   Psychiatric:         Mood and Affect: Mood normal.     Small joint arthrocentesis: R intertarsal  Universal Protocol:  procedure performed by consultantConsent: Verbal consent obtained.  Risks and benefits: risks, " "benefits and alternatives were discussed  Consent given by: patient  Time out: Immediately prior to procedure a \"time out\" was called to verify the correct patient, procedure, equipment, support staff and site/side marked as required.  Timeout called at: 3/31/2025 8:40 AM.  Patient understanding: patient states understanding of the procedure being performed  Patient identity confirmed: verbally with patient and provided demographic data  Supporting Documentation  Indications: pain   Procedure Details  Location: foot - R intertarsal  Preparation: Patient was prepped and draped in the usual sterile fashion  Needle size: 25 G  Ultrasound guidance: no  Approach: medial  Medications administered: 1 mL bupivacaine (PF) 0.25 %; 20 mg triamcinolone acetonide 40 mg/mL    Patient tolerance: patient tolerated the procedure well with no immediate complications            "

## 2025-04-29 ENCOUNTER — VBI (OUTPATIENT)
Dept: ADMINISTRATIVE | Facility: OTHER | Age: 70
End: 2025-04-29

## 2025-04-29 NOTE — TELEPHONE ENCOUNTER
04/29/25 1:45 PM    The patient was called and a message was left for patient to return a call to the [facility] Department at [facility]: Phone [phone] .    Thank you.  Samantah Lopez MA  [facility]

## 2025-06-28 DIAGNOSIS — I10 ESSENTIAL HYPERTENSION: ICD-10-CM

## 2025-06-30 RX ORDER — LISINOPRIL 10 MG/1
10 TABLET ORAL DAILY
Qty: 90 TABLET | Refills: 1 | Status: SHIPPED | OUTPATIENT
Start: 2025-06-30

## 2025-08-04 DIAGNOSIS — F41.9 ANXIETY: ICD-10-CM

## 2025-08-07 RX ORDER — ESCITALOPRAM OXALATE 10 MG/1
10 TABLET ORAL DAILY
Qty: 90 TABLET | Refills: 1 | Status: SHIPPED | OUTPATIENT
Start: 2025-08-07